# Patient Record
Sex: MALE | Race: WHITE | NOT HISPANIC OR LATINO | Employment: OTHER | URBAN - METROPOLITAN AREA
[De-identification: names, ages, dates, MRNs, and addresses within clinical notes are randomized per-mention and may not be internally consistent; named-entity substitution may affect disease eponyms.]

---

## 2017-05-08 NOTE — PATIENT DISCUSSION
DRY EYES : Discussed with patient the importance of keeping the eye moist and the symptoms associated with dry eyes including blurry vision, tearing, burning, and philip sensation. Advised patient to minimize use of any fans blowing directly on the face. Advised patient to continue with artificial tears 2-3 times daily.

## 2017-05-08 NOTE — PATIENT DISCUSSION
POSTERIOR CAPSULAR FIBROSIS, OU - VISUALLY SIGNIFICANT. SCHEDULE YAG CAPSULOTOMY OD FIRST THEN LATER YAG CAPSULOTOMY OS IF VISUAL SYMPTOMS PERSIST.

## 2017-05-30 NOTE — PATIENT DISCUSSION
ADVISED PT THAT SHE NEEDS TO USE ARTIFICIAL TEARS SEVERAL TIMES A DAY FOR THE DRYNESS.  CALL IF SYMPTOMS WORSEN

## 2017-08-17 ENCOUNTER — ESTABLISHED PATIENT (OUTPATIENT)
Dept: URBAN - METROPOLITAN AREA CLINIC 43 | Facility: CLINIC | Age: 77
End: 2017-08-17

## 2017-08-17 DIAGNOSIS — H35.3111: ICD-10-CM

## 2017-08-17 DIAGNOSIS — H43.812: ICD-10-CM

## 2017-08-17 DIAGNOSIS — H35.3221: ICD-10-CM

## 2017-08-17 DIAGNOSIS — H25.813: ICD-10-CM

## 2017-08-17 PROCEDURE — 92134 CPTRZ OPH DX IMG PST SGM RTA: CPT

## 2017-08-17 PROCEDURE — 92014 COMPRE OPH EXAM EST PT 1/>: CPT

## 2017-08-17 PROCEDURE — 67028 INJECTION EYE DRUG: CPT

## 2017-08-17 ASSESSMENT — TONOMETRY
OS_IOP_MMHG: 17
OD_IOP_MMHG: 17

## 2017-08-17 ASSESSMENT — VISUAL ACUITY
OD_CC: 20/30+1
OS_CC: 20/30-2

## 2017-09-18 ENCOUNTER — ESTABLISHED PATIENT (OUTPATIENT)
Dept: URBAN - METROPOLITAN AREA CLINIC 35 | Facility: CLINIC | Age: 77
End: 2017-09-18

## 2017-09-18 DIAGNOSIS — H35.3221: ICD-10-CM

## 2017-09-18 PROCEDURE — 67028 INJECTION EYE DRUG: CPT

## 2017-09-18 ASSESSMENT — VISUAL ACUITY
OS_CC: 20/30+2
OD_CC: 20/30-2

## 2017-10-16 ENCOUNTER — EST. PATIENT EMERGENCY (OUTPATIENT)
Dept: URBAN - METROPOLITAN AREA CLINIC 43 | Facility: CLINIC | Age: 77
End: 2017-10-16

## 2017-10-16 DIAGNOSIS — H43.811: ICD-10-CM

## 2017-10-16 DIAGNOSIS — H43.812: ICD-10-CM

## 2017-10-16 DIAGNOSIS — H35.3114: ICD-10-CM

## 2017-10-16 DIAGNOSIS — H35.3221: ICD-10-CM

## 2017-10-16 PROCEDURE — 92014 COMPRE OPH EXAM EST PT 1/>: CPT

## 2017-10-16 PROCEDURE — 92134 CPTRZ OPH DX IMG PST SGM RTA: CPT

## 2017-10-16 ASSESSMENT — VISUAL ACUITY
OD_CC: 20/40+2
OS_CC: 20/30

## 2017-10-16 ASSESSMENT — TONOMETRY
OS_IOP_MMHG: 15
OD_IOP_MMHG: 17

## 2017-10-17 ENCOUNTER — EST. PATIENT EMERGENCY (OUTPATIENT)
Dept: URBAN - METROPOLITAN AREA CLINIC 46 | Facility: CLINIC | Age: 77
End: 2017-10-17

## 2017-10-17 DIAGNOSIS — H43.821: ICD-10-CM

## 2017-10-17 DIAGNOSIS — H43.822: ICD-10-CM

## 2017-10-17 DIAGNOSIS — H25.813: ICD-10-CM

## 2017-10-17 DIAGNOSIS — H35.3221: ICD-10-CM

## 2017-10-17 DIAGNOSIS — H35.3211: ICD-10-CM

## 2017-10-17 DIAGNOSIS — H43.813: ICD-10-CM

## 2017-10-17 PROCEDURE — 9222650 BILAT EXTENDED OPHTHALMOSCOPY, F/U

## 2017-10-17 PROCEDURE — 92250 FUNDUS PHOTOGRAPHY W/I&R: CPT

## 2017-10-17 PROCEDURE — 92134 CPTRZ OPH DX IMG PST SGM RTA: CPT

## 2017-10-17 PROCEDURE — 92235 FLUORESCEIN ANGRPH MLTIFRAME: CPT

## 2017-10-17 PROCEDURE — 92014 COMPRE OPH EXAM EST PT 1/>: CPT

## 2017-10-17 PROCEDURE — 67028 INJECTION EYE DRUG: CPT

## 2017-10-17 ASSESSMENT — VISUAL ACUITY
OS_CC: 20/25-1
OD_CC: 20/30-2

## 2017-10-17 ASSESSMENT — TONOMETRY
OS_IOP_MMHG: 14
OD_IOP_MMHG: 13

## 2017-10-27 ENCOUNTER — CONSULT (OUTPATIENT)
Dept: URBAN - METROPOLITAN AREA CLINIC 43 | Facility: CLINIC | Age: 77
End: 2017-10-27

## 2017-10-27 VITALS — HEIGHT: 60 IN | DIASTOLIC BLOOD PRESSURE: 51 MMHG | SYSTOLIC BLOOD PRESSURE: 106 MMHG | HEART RATE: 93 BPM

## 2017-10-27 DIAGNOSIS — H35.3211: ICD-10-CM

## 2017-10-27 DIAGNOSIS — H43.821: ICD-10-CM

## 2017-10-27 DIAGNOSIS — H43.822: ICD-10-CM

## 2017-10-27 DIAGNOSIS — H35.3221: ICD-10-CM

## 2017-10-27 PROCEDURE — 92134 CPTRZ OPH DX IMG PST SGM RTA: CPT

## 2017-10-27 PROCEDURE — G8427 DOCREV CUR MEDS BY ELIG CLIN: HCPCS

## 2017-10-27 PROCEDURE — G8752 SYS BP LESS 140: HCPCS

## 2017-10-27 PROCEDURE — 92014 COMPRE OPH EXAM EST PT 1/>: CPT

## 2017-10-27 PROCEDURE — G8754 DIAS BP LESS 90: HCPCS

## 2017-10-27 PROCEDURE — 4177F TALK PT/CRGVR RE AREDS PREV: CPT

## 2017-10-27 PROCEDURE — 2019F DILATED MACUL EXAM DONE: CPT

## 2017-10-27 ASSESSMENT — VISUAL ACUITY
OD_CC: J4
OS_CC: 20/30+1
OS_CC: J1
OD_CC: 20/70+1

## 2017-10-27 ASSESSMENT — TONOMETRY
OS_IOP_MMHG: 15
OD_IOP_MMHG: 16

## 2017-11-15 ENCOUNTER — ESTABLISHED PATIENT (OUTPATIENT)
Dept: URBAN - METROPOLITAN AREA CLINIC 43 | Facility: CLINIC | Age: 77
End: 2017-11-15

## 2017-11-15 DIAGNOSIS — H35.3221: ICD-10-CM

## 2017-11-15 DIAGNOSIS — H35.3211: ICD-10-CM

## 2017-11-15 PROCEDURE — 6702850 BILATERAL INTRAVITREAL INJECTION

## 2017-11-15 ASSESSMENT — VISUAL ACUITY
OS_CC: 20/30-2
OD_CC: 20/60-2

## 2017-11-15 ASSESSMENT — TONOMETRY
OD_IOP_MMHG: 14
OS_IOP_MMHG: 13

## 2017-12-13 ENCOUNTER — ESTABLISHED PATIENT (OUTPATIENT)
Dept: URBAN - METROPOLITAN AREA CLINIC 43 | Facility: CLINIC | Age: 77
End: 2017-12-13

## 2017-12-13 DIAGNOSIS — H35.3211: ICD-10-CM

## 2017-12-13 PROCEDURE — 67028 INJECTION EYE DRUG: CPT

## 2017-12-13 ASSESSMENT — VISUAL ACUITY
OD_CC: 20/40-2
OS_CC: 20/30-2

## 2017-12-13 ASSESSMENT — TONOMETRY: OD_IOP_MMHG: 16

## 2018-01-25 ENCOUNTER — ESTABLISHED PATIENT (OUTPATIENT)
Dept: URBAN - METROPOLITAN AREA CLINIC 43 | Facility: CLINIC | Age: 78
End: 2018-01-25

## 2018-01-25 DIAGNOSIS — H43.822: ICD-10-CM

## 2018-01-25 DIAGNOSIS — H35.3212: ICD-10-CM

## 2018-01-25 DIAGNOSIS — H35.3222: ICD-10-CM

## 2018-01-25 DIAGNOSIS — H43.821: ICD-10-CM

## 2018-01-25 PROCEDURE — 2019F DILATED MACUL EXAM DONE: CPT

## 2018-01-25 PROCEDURE — G8756 NO BP MEASURE DOC: HCPCS

## 2018-01-25 PROCEDURE — G8427 DOCREV CUR MEDS BY ELIG CLIN: HCPCS

## 2018-01-25 PROCEDURE — 92134 CPTRZ OPH DX IMG PST SGM RTA: CPT

## 2018-01-25 PROCEDURE — 9222650 BILAT EXTENDED OPHTHALMOSCOPY, F/U

## 2018-01-25 PROCEDURE — 92012 INTRM OPH EXAM EST PATIENT: CPT

## 2018-01-25 PROCEDURE — 4177F TALK PT/CRGVR RE AREDS PREV: CPT

## 2018-01-25 ASSESSMENT — VISUAL ACUITY
OD_CC: 20/40+2
OS_CC: 20/30+1

## 2018-01-25 ASSESSMENT — TONOMETRY
OD_IOP_MMHG: 17
OS_IOP_MMHG: 15

## 2018-01-31 ENCOUNTER — ESTABLISHED PATIENT (OUTPATIENT)
Dept: URBAN - METROPOLITAN AREA CLINIC 43 | Facility: CLINIC | Age: 78
End: 2018-01-31

## 2018-01-31 DIAGNOSIS — H35.3222: ICD-10-CM

## 2018-01-31 DIAGNOSIS — H35.3212: ICD-10-CM

## 2018-01-31 PROCEDURE — 6702850 BILATERAL INTRAVITREAL INJECTION

## 2018-01-31 ASSESSMENT — VISUAL ACUITY
OS_CC: 20/25+2
OD_CC: 20/25

## 2018-01-31 ASSESSMENT — TONOMETRY
OD_IOP_MMHG: 15
OS_IOP_MMHG: 14

## 2018-03-07 ENCOUNTER — ESTABLISHED PATIENT (OUTPATIENT)
Dept: URBAN - METROPOLITAN AREA CLINIC 43 | Facility: CLINIC | Age: 78
End: 2018-03-07

## 2018-03-07 DIAGNOSIS — H35.3212: ICD-10-CM

## 2018-03-07 PROCEDURE — 67028 INJECTION EYE DRUG: CPT

## 2018-03-07 ASSESSMENT — VISUAL ACUITY
OS_CC: 20/25-1
OD_CC: 20/25-2

## 2018-03-07 ASSESSMENT — TONOMETRY: OD_IOP_MMHG: 18

## 2018-04-11 ENCOUNTER — ESTABLISHED PATIENT (OUTPATIENT)
Dept: URBAN - METROPOLITAN AREA CLINIC 43 | Facility: CLINIC | Age: 78
End: 2018-04-11

## 2018-04-11 DIAGNOSIS — H35.3222: ICD-10-CM

## 2018-04-11 DIAGNOSIS — H35.3212: ICD-10-CM

## 2018-04-11 PROCEDURE — 6702850 BILATERAL INTRAVITREAL INJECTION

## 2018-04-11 ASSESSMENT — TONOMETRY
OD_IOP_MMHG: 16
OS_IOP_MMHG: 14

## 2018-04-11 ASSESSMENT — VISUAL ACUITY
OS_CC: 20/30
OD_CC: 20/30-1

## 2018-05-25 ENCOUNTER — ESTABLISHED PATIENT (OUTPATIENT)
Dept: URBAN - METROPOLITAN AREA CLINIC 43 | Facility: CLINIC | Age: 78
End: 2018-05-25

## 2018-05-25 DIAGNOSIS — H35.3222: ICD-10-CM

## 2018-05-25 DIAGNOSIS — H43.821: ICD-10-CM

## 2018-05-25 DIAGNOSIS — H35.3212: ICD-10-CM

## 2018-05-25 DIAGNOSIS — H43.822: ICD-10-CM

## 2018-05-25 PROCEDURE — 92134 CPTRZ OPH DX IMG PST SGM RTA: CPT

## 2018-05-25 PROCEDURE — 92012 INTRM OPH EXAM EST PATIENT: CPT

## 2018-05-25 PROCEDURE — 9222650 BILAT EXTENDED OPHTHALMOSCOPY, F/U

## 2018-05-25 ASSESSMENT — VISUAL ACUITY
OD_CC: 20/40+2
OS_CC: 20/40+1

## 2018-05-25 ASSESSMENT — TONOMETRY
OD_IOP_MMHG: 20
OS_IOP_MMHG: 18

## 2018-06-20 ENCOUNTER — CLINIC PROCEDURE ONLY (OUTPATIENT)
Dept: URBAN - METROPOLITAN AREA CLINIC 43 | Facility: CLINIC | Age: 78
End: 2018-06-20

## 2018-06-20 DIAGNOSIS — H35.3222: ICD-10-CM

## 2018-06-20 DIAGNOSIS — H35.3212: ICD-10-CM

## 2018-06-20 PROCEDURE — 6702850 BILATERAL INTRAVITREAL INJECTION

## 2018-06-20 ASSESSMENT — VISUAL ACUITY
OD_CC: 20/40+2
OS_CC: 20/30-2

## 2018-06-20 ASSESSMENT — TONOMETRY: OD_IOP_MMHG: 18

## 2018-08-08 ENCOUNTER — CLINIC PROCEDURE ONLY (OUTPATIENT)
Dept: URBAN - METROPOLITAN AREA CLINIC 43 | Facility: CLINIC | Age: 78
End: 2018-08-08

## 2018-08-08 DIAGNOSIS — H43.821: ICD-10-CM

## 2018-08-08 DIAGNOSIS — H35.3222: ICD-10-CM

## 2018-08-08 DIAGNOSIS — H43.812: ICD-10-CM

## 2018-08-08 DIAGNOSIS — H43.392: ICD-10-CM

## 2018-08-08 DIAGNOSIS — H35.3212: ICD-10-CM

## 2018-08-08 PROCEDURE — 92012 INTRM OPH EXAM EST PATIENT: CPT

## 2018-08-08 PROCEDURE — 92225 OPHTHALMOSCOPY (INITIAL): CPT

## 2018-08-08 PROCEDURE — 92134 CPTRZ OPH DX IMG PST SGM RTA: CPT

## 2018-08-08 PROCEDURE — 6702850 BILATERAL INTRAVITREAL INJECTION

## 2018-08-08 PROCEDURE — 92226 OPHTHALMOSCOPY (SUB): CPT

## 2018-08-08 ASSESSMENT — VISUAL ACUITY
OD_CC: 20/40+2
OS_CC: 20/30

## 2018-08-08 ASSESSMENT — TONOMETRY
OS_IOP_MMHG: 19
OD_IOP_MMHG: 20

## 2018-08-31 ENCOUNTER — ESTABLISHED PATIENT (OUTPATIENT)
Dept: URBAN - METROPOLITAN AREA CLINIC 43 | Facility: CLINIC | Age: 78
End: 2018-08-31

## 2018-08-31 DIAGNOSIS — H43.392: ICD-10-CM

## 2018-08-31 DIAGNOSIS — H43.812: ICD-10-CM

## 2018-08-31 DIAGNOSIS — H35.3212: ICD-10-CM

## 2018-08-31 DIAGNOSIS — H43.822: ICD-10-CM

## 2018-08-31 DIAGNOSIS — H35.3222: ICD-10-CM

## 2018-08-31 DIAGNOSIS — H43.821: ICD-10-CM

## 2018-08-31 PROCEDURE — 92012 INTRM OPH EXAM EST PATIENT: CPT

## 2018-08-31 PROCEDURE — 9222650 BILAT EXTENDED OPHTHALMOSCOPY, F/U

## 2018-08-31 PROCEDURE — 92134 CPTRZ OPH DX IMG PST SGM RTA: CPT

## 2018-08-31 ASSESSMENT — TONOMETRY
OS_IOP_MMHG: 17
OD_IOP_MMHG: 17

## 2018-08-31 ASSESSMENT — VISUAL ACUITY
OS_PH: 20/30-2
OD_PH: 20/30-2
OS_CC: 20/40+2
OD_CC: 20/40-1

## 2018-11-07 ENCOUNTER — ESTABLISHED PATIENT (OUTPATIENT)
Dept: URBAN - METROPOLITAN AREA CLINIC 43 | Facility: CLINIC | Age: 78
End: 2018-11-07

## 2018-11-07 DIAGNOSIS — H43.392: ICD-10-CM

## 2018-11-07 DIAGNOSIS — H43.812: ICD-10-CM

## 2018-11-07 DIAGNOSIS — H35.3211: ICD-10-CM

## 2018-11-07 DIAGNOSIS — H35.3222: ICD-10-CM

## 2018-11-07 DIAGNOSIS — H43.821: ICD-10-CM

## 2018-11-07 PROCEDURE — 9222650 BILAT EXTENDED OPHTHALMOSCOPY, F/U

## 2018-11-07 PROCEDURE — 92012 INTRM OPH EXAM EST PATIENT: CPT

## 2018-11-07 PROCEDURE — 92134 CPTRZ OPH DX IMG PST SGM RTA: CPT

## 2018-11-07 ASSESSMENT — VISUAL ACUITY
OD_CC: 20/200
OS_CC: 20/25-2

## 2018-11-07 ASSESSMENT — TONOMETRY
OS_IOP_MMHG: 18
OD_IOP_MMHG: 20

## 2018-12-05 ENCOUNTER — CLINIC PROCEDURE ONLY (OUTPATIENT)
Dept: URBAN - METROPOLITAN AREA CLINIC 43 | Facility: CLINIC | Age: 78
End: 2018-12-05

## 2018-12-05 DIAGNOSIS — H35.3211: ICD-10-CM

## 2018-12-05 PROCEDURE — 67028 INJECTION EYE DRUG: CPT

## 2018-12-05 ASSESSMENT — TONOMETRY: OD_IOP_MMHG: 20

## 2018-12-05 ASSESSMENT — VISUAL ACUITY
OS_CC: 20/30-2
OD_CC: 20/100-1+1

## 2019-01-02 ENCOUNTER — CLINIC PROCEDURE ONLY (OUTPATIENT)
Dept: URBAN - METROPOLITAN AREA CLINIC 43 | Facility: CLINIC | Age: 79
End: 2019-01-02

## 2019-01-02 DIAGNOSIS — H35.3222: ICD-10-CM

## 2019-01-02 DIAGNOSIS — H35.3211: ICD-10-CM

## 2019-01-02 PROCEDURE — 6702850 BILATERAL INTRAVITREAL INJECTION

## 2019-01-02 ASSESSMENT — TONOMETRY
OD_IOP_MMHG: 18
OS_IOP_MMHG: 18

## 2019-01-02 ASSESSMENT — VISUAL ACUITY
OD_CC: 20/200
OS_CC: 20/40

## 2019-01-31 ENCOUNTER — ESTABLISHED PATIENT (OUTPATIENT)
Dept: URBAN - METROPOLITAN AREA CLINIC 43 | Facility: CLINIC | Age: 79
End: 2019-01-31

## 2019-01-31 DIAGNOSIS — H43.821: ICD-10-CM

## 2019-01-31 DIAGNOSIS — H43.812: ICD-10-CM

## 2019-01-31 DIAGNOSIS — H35.3221: ICD-10-CM

## 2019-01-31 DIAGNOSIS — H35.3211: ICD-10-CM

## 2019-01-31 DIAGNOSIS — H43.392: ICD-10-CM

## 2019-01-31 PROCEDURE — 92012 INTRM OPH EXAM EST PATIENT: CPT

## 2019-01-31 PROCEDURE — 9222650 BILAT EXTENDED OPHTHALMOSCOPY, F/U

## 2019-01-31 PROCEDURE — 92134 CPTRZ OPH DX IMG PST SGM RTA: CPT

## 2019-01-31 RX ORDER — LATANOPROST 0.05 MG/ML
1 SOLUTION/ DROPS OPHTHALMIC; TOPICAL EVERY EVENING
Start: 2019-01-31

## 2019-01-31 ASSESSMENT — VISUAL ACUITY
OD_CC: 20/200
OS_CC: 20/25

## 2019-01-31 ASSESSMENT — TONOMETRY
OS_IOP_MMHG: 26
OD_IOP_MMHG: 24

## 2019-02-06 ENCOUNTER — CLINIC PROCEDURE ONLY (OUTPATIENT)
Dept: URBAN - METROPOLITAN AREA CLINIC 43 | Facility: CLINIC | Age: 79
End: 2019-02-06

## 2019-02-06 DIAGNOSIS — H35.3211: ICD-10-CM

## 2019-02-06 DIAGNOSIS — H35.3221: ICD-10-CM

## 2019-02-06 PROCEDURE — 6702850 BILATERAL INTRAVITREAL INJECTION

## 2019-02-06 RX ORDER — LATANOPROST 50 UG/ML: 1 SOLUTION/ DROPS OPHTHALMIC EVERY EVENING

## 2019-02-06 ASSESSMENT — VISUAL ACUITY
OD_CC: 20/70+1
OS_CC: 20/25-1

## 2019-02-06 ASSESSMENT — TONOMETRY
OD_IOP_MMHG: 14
OS_IOP_MMHG: 13

## 2019-03-07 ENCOUNTER — ESTABLISHED PATIENT (OUTPATIENT)
Dept: URBAN - METROPOLITAN AREA CLINIC 43 | Facility: CLINIC | Age: 79
End: 2019-03-07

## 2019-03-07 DIAGNOSIS — H35.3211: ICD-10-CM

## 2019-03-07 DIAGNOSIS — H35.3221: ICD-10-CM

## 2019-03-07 PROCEDURE — 9222650 BILAT EXTENDED OPHTHALMOSCOPY, F/U

## 2019-03-07 PROCEDURE — 92134 CPTRZ OPH DX IMG PST SGM RTA: CPT

## 2019-03-07 PROCEDURE — 92012 INTRM OPH EXAM EST PATIENT: CPT

## 2019-03-07 RX ORDER — TIMOLOL MALEATE 6.8 MG/ML
1 SOLUTION OPHTHALMIC EVERY MORNING
Start: 2019-03-07

## 2019-03-07 ASSESSMENT — TONOMETRY
OD_IOP_MMHG: 23
OS_IOP_MMHG: 22

## 2019-03-07 ASSESSMENT — VISUAL ACUITY
OS_CC: 20/40+2
OD_CC: 20/400

## 2019-03-13 ENCOUNTER — CLINIC PROCEDURE ONLY (OUTPATIENT)
Dept: URBAN - METROPOLITAN AREA CLINIC 43 | Facility: CLINIC | Age: 79
End: 2019-03-13

## 2019-03-13 DIAGNOSIS — H35.3221: ICD-10-CM

## 2019-03-13 DIAGNOSIS — H35.3211: ICD-10-CM

## 2019-03-13 PROCEDURE — 6702850 BILATERAL INTRAVITREAL INJECTION

## 2019-03-13 ASSESSMENT — TONOMETRY
OS_IOP_MMHG: 16
OD_IOP_MMHG: 17

## 2019-03-13 ASSESSMENT — VISUAL ACUITY
OD_CC: 20/400
OS_CC: 20/40+2

## 2019-04-16 ENCOUNTER — ESTABLISHED COMPREHENSIVE EXAM (OUTPATIENT)
Dept: URBAN - METROPOLITAN AREA CLINIC 43 | Facility: CLINIC | Age: 79
End: 2019-04-16

## 2019-04-16 DIAGNOSIS — H40.053: ICD-10-CM

## 2019-04-16 DIAGNOSIS — H25.813: ICD-10-CM

## 2019-04-16 DIAGNOSIS — H40.033: ICD-10-CM

## 2019-04-16 PROCEDURE — 92132 CPTRZD OPH DX IMG ANT SGM: CPT

## 2019-04-16 PROCEDURE — 92015 DETERMINE REFRACTIVE STATE: CPT

## 2019-04-16 PROCEDURE — 92014 COMPRE OPH EXAM EST PT 1/>: CPT

## 2019-04-16 ASSESSMENT — VISUAL ACUITY
OD_SC: J14
OS_CC: 20/30-2
OS_SC: J2
OS_SC: 20/200+1
OS_CC: J1
OD_BAT: <20/400
OD_CC: J14
OS_BAT: <20/400

## 2019-04-16 ASSESSMENT — TONOMETRY
OD_IOP_MMHG: 20
OS_IOP_MMHG: 18

## 2019-04-26 ENCOUNTER — ESTABLISHED PATIENT (OUTPATIENT)
Dept: URBAN - METROPOLITAN AREA CLINIC 43 | Facility: CLINIC | Age: 79
End: 2019-04-26

## 2019-04-26 DIAGNOSIS — H35.3221: ICD-10-CM

## 2019-04-26 DIAGNOSIS — H35.3211: ICD-10-CM

## 2019-04-26 PROCEDURE — 9222650 BILAT EXTENDED OPHTHALMOSCOPY, F/U

## 2019-04-26 PROCEDURE — 92012 INTRM OPH EXAM EST PATIENT: CPT

## 2019-04-26 PROCEDURE — 92134 CPTRZ OPH DX IMG PST SGM RTA: CPT

## 2019-04-26 ASSESSMENT — VISUAL ACUITY
OS_CC: 20/40+2
OD_CC: 20/200+1

## 2019-04-26 ASSESSMENT — TONOMETRY
OD_IOP_MMHG: 17
OS_IOP_MMHG: 16

## 2019-04-29 ENCOUNTER — CATARACT CONSULT (OUTPATIENT)
Dept: URBAN - METROPOLITAN AREA CLINIC 43 | Facility: CLINIC | Age: 79
End: 2019-04-29

## 2019-04-29 VITALS
RESPIRATION RATE: 14 BRPM | HEIGHT: 60 IN | SYSTOLIC BLOOD PRESSURE: 157 MMHG | HEART RATE: 68 BPM | DIASTOLIC BLOOD PRESSURE: 90 MMHG

## 2019-04-29 DIAGNOSIS — H43.812: ICD-10-CM

## 2019-04-29 DIAGNOSIS — H40.1131: ICD-10-CM

## 2019-04-29 DIAGNOSIS — H43.822: ICD-10-CM

## 2019-04-29 DIAGNOSIS — H25.811: ICD-10-CM

## 2019-04-29 DIAGNOSIS — H40.033: ICD-10-CM

## 2019-04-29 DIAGNOSIS — H25.812: ICD-10-CM

## 2019-04-29 PROCEDURE — 92014 COMPRE OPH EXAM EST PT 1/>: CPT

## 2019-04-29 PROCEDURE — V2799I IMPRIMIS PREDGATIBROM

## 2019-04-29 PROCEDURE — 92136TC INTERFEROMETRY - TECHNICAL COMPONENT

## 2019-04-29 PROCEDURE — 92025-2 CORNEAL TOPOGRAPHY, PT

## 2019-04-29 RX ORDER — AMOXICILLIN 500 MG/1: 1 CAPSULE ORAL

## 2019-04-29 ASSESSMENT — VISUAL ACUITY
OD_CC: 20/400
OS_BAT: 20/200
OS_CC: 20/30+2
OS_CC: J12
OD_CC: J2
OD_BAT: 20/400

## 2019-04-29 ASSESSMENT — TONOMETRY
OD_IOP_MMHG: 18
OS_IOP_MMHG: 19

## 2019-05-01 ENCOUNTER — CLINIC PROCEDURE ONLY (OUTPATIENT)
Dept: URBAN - METROPOLITAN AREA CLINIC 43 | Facility: CLINIC | Age: 79
End: 2019-05-01

## 2019-05-01 DIAGNOSIS — H35.3211: ICD-10-CM

## 2019-05-01 DIAGNOSIS — H35.3221: ICD-10-CM

## 2019-05-01 PROCEDURE — 6702850 BILATERAL INTRAVITREAL INJECTION

## 2019-05-01 ASSESSMENT — VISUAL ACUITY
OD_CC: 20/400
OS_CC: 20/40+2

## 2019-05-01 ASSESSMENT — TONOMETRY
OS_IOP_MMHG: 15
OD_IOP_MMHG: 16

## 2019-05-09 ENCOUNTER — SURGERY/PROCEDURE (OUTPATIENT)
Dept: URBAN - METROPOLITAN AREA CLINIC 43 | Facility: CLINIC | Age: 79
End: 2019-05-09

## 2019-05-09 ENCOUNTER — PRE-OP/H&P (OUTPATIENT)
Dept: URBAN - METROPOLITAN AREA SURGERY 14 | Facility: SURGERY | Age: 79
End: 2019-05-09

## 2019-05-09 DIAGNOSIS — H40.1121: ICD-10-CM

## 2019-05-09 DIAGNOSIS — H40.1131: ICD-10-CM

## 2019-05-09 DIAGNOSIS — H40.033: ICD-10-CM

## 2019-05-09 DIAGNOSIS — H43.822: ICD-10-CM

## 2019-05-09 DIAGNOSIS — H25.812: ICD-10-CM

## 2019-05-09 DIAGNOSIS — H25.811: ICD-10-CM

## 2019-05-09 DIAGNOSIS — H25.813: ICD-10-CM

## 2019-05-09 PROCEDURE — 66999LNSR LENSAR LASER FOR CAT SX

## 2019-05-09 PROCEDURE — 0191T INSERTION OF ANTERIOR SEGMENT AQUEOUS DRAINAGE DEVICE, WITHOUT EXTRAOCULAR RESERVOIR; INTERNAL APPROACH, INTO THE TRABECULAR MESHWORK, INITIAL INSERTION: CPT

## 2019-05-09 PROCEDURE — 99499 UNLISTED E&M SERVICE: CPT

## 2019-05-09 PROCEDURE — 66984CV REMOVE CATARACT, INSERT LENS, CUSTOM VISION

## 2019-05-09 PROCEDURE — 65772LRI LRI DURING CAT SX

## 2019-05-09 PROCEDURE — 0376T INSERTION OF ANTERIOR SEGMENT AQUEOUS DRAINAGE DEVICE, WITHOUT EXTRAOCULAR RESERVOIR, INTERNAL APPROACH, INTO THE TRABECULAR MESHWORK; EACH ADDITIONAL DEVICE INSERTION (LIST SEPARATELY IN ADDITION TO CODE FOR PRIMARY PROCEDURE): CPT

## 2019-05-10 ENCOUNTER — CATARACT POST-OP 1-DAY (OUTPATIENT)
Dept: URBAN - METROPOLITAN AREA CLINIC 43 | Facility: CLINIC | Age: 79
End: 2019-05-10

## 2019-05-10 DIAGNOSIS — Z96.1: ICD-10-CM

## 2019-05-10 DIAGNOSIS — H40.1131: ICD-10-CM

## 2019-05-10 PROCEDURE — 99024 POSTOP FOLLOW-UP VISIT: CPT

## 2019-05-10 ASSESSMENT — VISUAL ACUITY
OS_SC: 20/50
OD_SC: CF 4FT
OD_PH: 20/40

## 2019-05-10 ASSESSMENT — TONOMETRY
OS_IOP_MMHG: 13
OD_IOP_MMHG: 17

## 2019-05-15 ENCOUNTER — POST-OP (OUTPATIENT)
Dept: URBAN - METROPOLITAN AREA CLINIC 43 | Facility: CLINIC | Age: 79
End: 2019-05-15

## 2019-05-15 DIAGNOSIS — Z96.1: ICD-10-CM

## 2019-05-15 PROCEDURE — 99024 POSTOP FOLLOW-UP VISIT: CPT

## 2019-05-15 ASSESSMENT — VISUAL ACUITY
OS_PH: 20/60
OS_SC: J14
OD_SC: J14
OS_SC: 20/80

## 2019-05-15 ASSESSMENT — TONOMETRY
OS_IOP_MMHG: 14
OD_IOP_MMHG: 17

## 2019-05-22 ENCOUNTER — POST-OP (OUTPATIENT)
Dept: URBAN - METROPOLITAN AREA CLINIC 43 | Facility: CLINIC | Age: 79
End: 2019-05-22

## 2019-05-22 DIAGNOSIS — Z96.1: ICD-10-CM

## 2019-05-22 DIAGNOSIS — H04.122: ICD-10-CM

## 2019-05-22 PROCEDURE — 68761S PUNCTUM PLUG / SILICONE,EACH

## 2019-05-22 PROCEDURE — 99024 POSTOP FOLLOW-UP VISIT: CPT

## 2019-05-22 PROCEDURE — A4263 PERMANENT TEAR DUCT PLUG: HCPCS

## 2019-05-22 RX ORDER — LOTEPREDNOL ETABONATE 5 MG/G
1 GEL OPHTHALMIC
Start: 2019-05-22

## 2019-05-22 ASSESSMENT — TONOMETRY
OS_IOP_MMHG: 14
OD_IOP_MMHG: 16

## 2019-05-22 ASSESSMENT — VISUAL ACUITY
OS_SC: <J12
OS_SC: 20/60

## 2019-06-03 ENCOUNTER — POST-OP (OUTPATIENT)
Dept: URBAN - METROPOLITAN AREA CLINIC 43 | Facility: CLINIC | Age: 79
End: 2019-06-03

## 2019-06-03 DIAGNOSIS — H40.1131: ICD-10-CM

## 2019-06-03 DIAGNOSIS — H04.123: ICD-10-CM

## 2019-06-03 DIAGNOSIS — Z96.1: ICD-10-CM

## 2019-06-03 PROCEDURE — 99024 POSTOP FOLLOW-UP VISIT: CPT

## 2019-06-03 PROCEDURE — 68761S PUNCTUM PLUG / SILICONE,EACH

## 2019-06-03 PROCEDURE — 92020 GONIOSCOPY: CPT

## 2019-06-03 PROCEDURE — A4263 PERMANENT TEAR DUCT PLUG: HCPCS

## 2019-06-03 RX ORDER — BROMFENAC SODIUM 0.7 MG/ML: 1 SOLUTION/ DROPS OPHTHALMIC ONCE A DAY

## 2019-06-03 ASSESSMENT — VISUAL ACUITY
OD_SC: 20/400
OD_SC: 20/400
OS_SC: J8
OS_SC: 20/50-2
OU_SC: J8
OU_SC: 20/50-2

## 2019-06-03 ASSESSMENT — TONOMETRY
OS_IOP_MMHG: 15
OD_IOP_MMHG: 18

## 2019-06-12 ENCOUNTER — POST OP/EVAL OF SECOND EYE (OUTPATIENT)
Dept: URBAN - METROPOLITAN AREA CLINIC 43 | Facility: CLINIC | Age: 79
End: 2019-06-12

## 2019-06-12 ENCOUNTER — CLINIC PROCEDURE ONLY (OUTPATIENT)
Dept: URBAN - METROPOLITAN AREA CLINIC 43 | Facility: CLINIC | Age: 79
End: 2019-06-12

## 2019-06-12 DIAGNOSIS — H35.3211: ICD-10-CM

## 2019-06-12 DIAGNOSIS — H04.123: ICD-10-CM

## 2019-06-12 DIAGNOSIS — Z96.1: ICD-10-CM

## 2019-06-12 DIAGNOSIS — H35.3221: ICD-10-CM

## 2019-06-12 PROCEDURE — 6702850 BILATERAL INTRAVITREAL INJECTION

## 2019-06-12 PROCEDURE — 99024 POSTOP FOLLOW-UP VISIT: CPT

## 2019-06-12 ASSESSMENT — VISUAL ACUITY
OS_SC: 20/40
OD_SC: 20/400
OS_SC: 20/40
OD_SC: 20/400

## 2019-06-12 ASSESSMENT — TONOMETRY
OS_IOP_MMHG: 16
OD_IOP_MMHG: 23
OD_IOP_MMHG: 23
OS_IOP_MMHG: 16

## 2019-07-01 ENCOUNTER — POST-OP (OUTPATIENT)
Dept: URBAN - METROPOLITAN AREA CLINIC 43 | Facility: CLINIC | Age: 79
End: 2019-07-01

## 2019-07-01 DIAGNOSIS — Z96.1: ICD-10-CM

## 2019-07-01 DIAGNOSIS — H40.1131: ICD-10-CM

## 2019-07-01 PROCEDURE — 99024 POSTOP FOLLOW-UP VISIT: CPT

## 2019-07-01 PROCEDURE — 92025NC COMP. CORNEAL TOPO, UNI OR BILAT,

## 2019-07-01 ASSESSMENT — TONOMETRY
OS_IOP_MMHG: 19
OD_IOP_MMHG: 22

## 2019-07-01 ASSESSMENT — VISUAL ACUITY
OD_SC: 20/400
OS_SC: 20/40-1

## 2019-07-18 ENCOUNTER — ESTABLISHED PATIENT (OUTPATIENT)
Dept: URBAN - METROPOLITAN AREA CLINIC 43 | Facility: CLINIC | Age: 79
End: 2019-07-18

## 2019-07-18 DIAGNOSIS — H43.812: ICD-10-CM

## 2019-07-18 DIAGNOSIS — H43.822: ICD-10-CM

## 2019-07-18 DIAGNOSIS — H43.821: ICD-10-CM

## 2019-07-18 DIAGNOSIS — H35.3211: ICD-10-CM

## 2019-07-18 DIAGNOSIS — H35.3221: ICD-10-CM

## 2019-07-18 DIAGNOSIS — H43.392: ICD-10-CM

## 2019-07-18 PROCEDURE — 92012 INTRM OPH EXAM EST PATIENT: CPT

## 2019-07-18 PROCEDURE — 9222650 BILAT EXTENDED OPHTHALMOSCOPY, F/U

## 2019-07-18 PROCEDURE — 92134 CPTRZ OPH DX IMG PST SGM RTA: CPT

## 2019-07-18 ASSESSMENT — VISUAL ACUITY
OS_CC: 20/25
OD_CC: 20/100

## 2019-07-18 ASSESSMENT — TONOMETRY
OS_IOP_MMHG: 19
OD_IOP_MMHG: 17

## 2019-07-24 ENCOUNTER — CLINIC PROCEDURE ONLY (OUTPATIENT)
Dept: URBAN - METROPOLITAN AREA CLINIC 43 | Facility: CLINIC | Age: 79
End: 2019-07-24

## 2019-07-24 DIAGNOSIS — H35.3221: ICD-10-CM

## 2019-07-24 DIAGNOSIS — H35.3211: ICD-10-CM

## 2019-07-24 PROCEDURE — 6702850 BILATERAL INTRAVITREAL INJECTION

## 2019-07-24 ASSESSMENT — VISUAL ACUITY
OS_CC: 20/30+1
OD_CC: 20/300

## 2019-07-24 ASSESSMENT — TONOMETRY
OS_IOP_MMHG: 10
OD_IOP_MMHG: 14

## 2019-09-04 ENCOUNTER — CLINIC PROCEDURE ONLY (OUTPATIENT)
Dept: URBAN - METROPOLITAN AREA CLINIC 43 | Facility: CLINIC | Age: 79
End: 2019-09-04

## 2019-09-04 DIAGNOSIS — H35.3211: ICD-10-CM

## 2019-09-04 DIAGNOSIS — H35.3221: ICD-10-CM

## 2019-09-04 PROCEDURE — 6702850 BILATERAL INTRAVITREAL INJECTION

## 2019-09-04 ASSESSMENT — TONOMETRY
OS_IOP_MMHG: 10
OD_IOP_MMHG: 18

## 2019-09-04 ASSESSMENT — VISUAL ACUITY
OS_CC: 20/30
OD_CC: 20/400

## 2019-10-16 ENCOUNTER — SURGERY/PROCEDURE (OUTPATIENT)
Dept: URBAN - METROPOLITAN AREA CLINIC 43 | Facility: CLINIC | Age: 79
End: 2019-10-16

## 2019-10-16 ENCOUNTER — ESTABLISHED PATIENT (OUTPATIENT)
Dept: URBAN - METROPOLITAN AREA SURGERY 14 | Facility: SURGERY | Age: 79
End: 2019-10-16

## 2019-10-16 DIAGNOSIS — H40.031: ICD-10-CM

## 2019-10-16 DIAGNOSIS — H25.811: ICD-10-CM

## 2019-10-16 DIAGNOSIS — H40.1131: ICD-10-CM

## 2019-10-16 DIAGNOSIS — H40.1111: ICD-10-CM

## 2019-10-16 DIAGNOSIS — Z96.1: ICD-10-CM

## 2019-10-16 DIAGNOSIS — H43.822: ICD-10-CM

## 2019-10-16 PROCEDURE — 0191T INSERTION OF ANTERIOR SEGMENT AQUEOUS DRAINAGE DEVICE, WITHOUT EXTRAOCULAR RESERVOIR; INTERNAL APPROACH, INTO THE TRABECULAR MESHWORK, INITIAL INSERTION: CPT

## 2019-10-16 PROCEDURE — 99211HP H&P OFFICE/OUTPATIENT VISIT, EST

## 2019-10-16 PROCEDURE — 66984 XCAPSL CTRC RMVL W/O ECP: CPT

## 2019-10-16 PROCEDURE — 0376T INSERTION OF ANTERIOR SEGMENT AQUEOUS DRAINAGE DEVICE, WITHOUT EXTRAOCULAR RESERVOIR, INTERNAL APPROACH, INTO THE TRABECULAR MESHWORK; EACH ADDITIONAL DEVICE INSERTION (LIST SEPARATELY IN ADDITION TO CODE FOR PRIMARY PROCEDURE): CPT

## 2019-10-17 ENCOUNTER — CATARACT POST-OP 1-DAY (OUTPATIENT)
Dept: URBAN - METROPOLITAN AREA CLINIC 43 | Facility: CLINIC | Age: 79
End: 2019-10-17

## 2019-10-17 DIAGNOSIS — H40.031: ICD-10-CM

## 2019-10-17 DIAGNOSIS — Z96.1: ICD-10-CM

## 2019-10-17 DIAGNOSIS — H43.822: ICD-10-CM

## 2019-10-17 DIAGNOSIS — H40.1131: ICD-10-CM

## 2019-10-17 PROCEDURE — 99024 POSTOP FOLLOW-UP VISIT: CPT

## 2019-10-17 ASSESSMENT — VISUAL ACUITY
OS_SC: J12
OD_SC: CF 6FT
OS_SC: 20/40-2
OD_SC: <J12

## 2019-10-17 ASSESSMENT — TONOMETRY
OD_IOP_MMHG: 15
OS_IOP_MMHG: 12

## 2019-11-01 ENCOUNTER — POST-OP CATARACT (OUTPATIENT)
Dept: URBAN - METROPOLITAN AREA CLINIC 43 | Facility: CLINIC | Age: 79
End: 2019-11-01

## 2019-11-01 DIAGNOSIS — H43.822: ICD-10-CM

## 2019-11-01 DIAGNOSIS — Z96.1: ICD-10-CM

## 2019-11-01 DIAGNOSIS — H40.1131: ICD-10-CM

## 2019-11-01 PROCEDURE — 99024 POSTOP FOLLOW-UP VISIT: CPT

## 2019-11-01 ASSESSMENT — VISUAL ACUITY
OS_SC: J12
OD_SC: CF 6FT
OS_SC: 20/50-1+1
OD_SC: <J12

## 2019-11-01 ASSESSMENT — TONOMETRY
OD_IOP_MMHG: 16
OS_IOP_MMHG: 13

## 2019-11-07 ENCOUNTER — ESTABLISHED PATIENT (OUTPATIENT)
Dept: URBAN - METROPOLITAN AREA CLINIC 43 | Facility: CLINIC | Age: 79
End: 2019-11-07

## 2019-11-07 DIAGNOSIS — H43.812: ICD-10-CM

## 2019-11-07 DIAGNOSIS — H43.392: ICD-10-CM

## 2019-11-07 DIAGNOSIS — H35.3221: ICD-10-CM

## 2019-11-07 DIAGNOSIS — H43.821: ICD-10-CM

## 2019-11-07 DIAGNOSIS — H35.3211: ICD-10-CM

## 2019-11-07 PROCEDURE — 92014 COMPRE OPH EXAM EST PT 1/>: CPT

## 2019-11-07 PROCEDURE — 9222650 BILAT EXTENDED OPHTHALMOSCOPY, F/U

## 2019-11-07 PROCEDURE — 92134 CPTRZ OPH DX IMG PST SGM RTA: CPT

## 2019-11-07 ASSESSMENT — VISUAL ACUITY
OS_CC: 20/25-1
OD_SC: 20/400

## 2019-11-07 ASSESSMENT — TONOMETRY
OD_IOP_MMHG: 15
OS_IOP_MMHG: 15

## 2019-11-13 ENCOUNTER — REFRACTIVE CONSULT (OUTPATIENT)
Dept: URBAN - METROPOLITAN AREA CLINIC 43 | Facility: CLINIC | Age: 79
End: 2019-11-13

## 2019-11-13 DIAGNOSIS — Z96.1: ICD-10-CM

## 2019-11-13 PROCEDURE — 99024 POSTOP FOLLOW-UP VISIT: CPT

## 2019-11-13 ASSESSMENT — TONOMETRY
OD_IOP_MMHG: 17
OS_IOP_MMHG: 16

## 2019-11-13 ASSESSMENT — VISUAL ACUITY
OS_CC: 20/30
OD_CC: 20/400

## 2019-11-20 ENCOUNTER — CLINIC PROCEDURE ONLY (OUTPATIENT)
Dept: URBAN - METROPOLITAN AREA CLINIC 43 | Facility: CLINIC | Age: 79
End: 2019-11-20

## 2019-11-20 DIAGNOSIS — H35.3221: ICD-10-CM

## 2019-11-20 DIAGNOSIS — H35.3211: ICD-10-CM

## 2019-11-20 PROCEDURE — 6702850 BILATERAL INTRAVITREAL INJECTION

## 2019-11-20 ASSESSMENT — TONOMETRY
OS_IOP_MMHG: 15
OD_IOP_MMHG: 18

## 2019-11-20 ASSESSMENT — VISUAL ACUITY
OS_CC: 20/30-2
OD_CC: 20/400

## 2020-01-15 ENCOUNTER — CLINIC PROCEDURE ONLY (OUTPATIENT)
Dept: URBAN - METROPOLITAN AREA CLINIC 43 | Facility: CLINIC | Age: 80
End: 2020-01-15

## 2020-01-15 DIAGNOSIS — H35.3221: ICD-10-CM

## 2020-01-15 DIAGNOSIS — H35.3211: ICD-10-CM

## 2020-01-15 PROCEDURE — 6702850 BILATERAL INTRAVITREAL INJECTION

## 2020-01-15 ASSESSMENT — TONOMETRY
OS_IOP_MMHG: 12
OD_IOP_MMHG: 15

## 2020-01-15 ASSESSMENT — VISUAL ACUITY
OS_SC: 20/40-1
OD_SC: 20/400

## 2020-03-05 ENCOUNTER — ESTABLISHED PATIENT (OUTPATIENT)
Dept: URBAN - METROPOLITAN AREA CLINIC 43 | Facility: CLINIC | Age: 80
End: 2020-03-05

## 2020-03-05 VITALS — HEIGHT: 60 IN

## 2020-03-05 DIAGNOSIS — H35.3221: ICD-10-CM

## 2020-03-05 DIAGNOSIS — H35.3211: ICD-10-CM

## 2020-03-05 DIAGNOSIS — H43.812: ICD-10-CM

## 2020-03-05 DIAGNOSIS — H43.821: ICD-10-CM

## 2020-03-05 DIAGNOSIS — H43.392: ICD-10-CM

## 2020-03-05 PROCEDURE — 92014 COMPRE OPH EXAM EST PT 1/>: CPT

## 2020-03-05 PROCEDURE — 92134 CPTRZ OPH DX IMG PST SGM RTA: CPT

## 2020-03-05 PROCEDURE — 92201 OPSCPY EXTND RTA DRAW UNI/BI: CPT

## 2020-03-05 ASSESSMENT — TONOMETRY
OD_IOP_MMHG: 19
OS_IOP_MMHG: 16

## 2020-03-05 ASSESSMENT — VISUAL ACUITY
OS_CC: 20/40+2
OD_CC: 20/400

## 2020-03-11 ENCOUNTER — ESTABLISHED COMPREHENSIVE EXAM (OUTPATIENT)
Dept: URBAN - METROPOLITAN AREA CLINIC 43 | Facility: CLINIC | Age: 80
End: 2020-03-11

## 2020-03-11 DIAGNOSIS — H43.822: ICD-10-CM

## 2020-03-11 DIAGNOSIS — H52.222: ICD-10-CM

## 2020-03-11 DIAGNOSIS — H04.123: ICD-10-CM

## 2020-03-11 DIAGNOSIS — H40.1131: ICD-10-CM

## 2020-03-11 PROCEDURE — 92015 DETERMINE REFRACTIVE STATE: CPT

## 2020-03-11 PROCEDURE — 92014 COMPRE OPH EXAM EST PT 1/>: CPT

## 2020-03-11 ASSESSMENT — VISUAL ACUITY
OD_SC: CF 5FT
OS_SC: J12
OD_SC: <J12
OD_CC: CF 5FT
OD_CC: <J12
OS_SC: 20/60-2
OS_CC: J2
OS_CC: 20/30-2

## 2020-03-11 ASSESSMENT — TONOMETRY
OS_IOP_MMHG: 14
OD_IOP_MMHG: 15

## 2020-03-25 ENCOUNTER — CLINIC PROCEDURE ONLY (OUTPATIENT)
Dept: URBAN - METROPOLITAN AREA CLINIC 43 | Facility: CLINIC | Age: 80
End: 2020-03-25

## 2020-03-25 VITALS — HEIGHT: 60 IN

## 2020-03-25 DIAGNOSIS — H35.3211: ICD-10-CM

## 2020-03-25 DIAGNOSIS — H35.3221: ICD-10-CM

## 2020-03-25 PROCEDURE — 6702850 BILATERAL INTRAVITREAL INJECTION

## 2020-03-25 ASSESSMENT — VISUAL ACUITY
OD_CC: CF 4FT
OS_CC: 20/30

## 2020-03-25 ASSESSMENT — TONOMETRY
OD_IOP_MMHG: 13
OS_IOP_MMHG: 23 (X2)

## 2020-05-20 ENCOUNTER — CLINIC PROCEDURE ONLY (OUTPATIENT)
Dept: URBAN - METROPOLITAN AREA CLINIC 43 | Facility: CLINIC | Age: 80
End: 2020-05-20

## 2020-05-20 DIAGNOSIS — H35.3221: ICD-10-CM

## 2020-05-20 DIAGNOSIS — H35.3211: ICD-10-CM

## 2020-05-20 PROCEDURE — 6702850 BILATERAL INTRAVITREAL INJECTION

## 2020-05-20 ASSESSMENT — VISUAL ACUITY
OD_SC: CF 6FT
OS_SC: 20/30+2

## 2020-05-20 ASSESSMENT — TONOMETRY
OD_IOP_MMHG: 16
OS_IOP_MMHG: 13

## 2020-07-15 ENCOUNTER — CLINIC PROCEDURE ONLY (OUTPATIENT)
Dept: URBAN - METROPOLITAN AREA CLINIC 43 | Facility: CLINIC | Age: 80
End: 2020-07-15

## 2020-07-15 DIAGNOSIS — H35.3221: ICD-10-CM

## 2020-07-15 DIAGNOSIS — H35.3211: ICD-10-CM

## 2020-07-15 PROCEDURE — 6702850 BILATERAL INTRAVITREAL INJECTION

## 2020-07-15 ASSESSMENT — TONOMETRY
OD_IOP_MMHG: 15
OS_IOP_MMHG: 13

## 2020-07-15 ASSESSMENT — VISUAL ACUITY
OD_CC: CF 6FT
OS_CC: 20/30+2

## 2020-09-03 ENCOUNTER — ESTABLISHED PATIENT (OUTPATIENT)
Dept: URBAN - METROPOLITAN AREA CLINIC 43 | Facility: CLINIC | Age: 80
End: 2020-09-03

## 2020-09-03 DIAGNOSIS — H35.3221: ICD-10-CM

## 2020-09-03 DIAGNOSIS — H43.812: ICD-10-CM

## 2020-09-03 DIAGNOSIS — H43.392: ICD-10-CM

## 2020-09-03 DIAGNOSIS — H43.821: ICD-10-CM

## 2020-09-03 DIAGNOSIS — H35.3211: ICD-10-CM

## 2020-09-03 PROCEDURE — 92134 CPTRZ OPH DX IMG PST SGM RTA: CPT

## 2020-09-03 PROCEDURE — 92014 COMPRE OPH EXAM EST PT 1/>: CPT

## 2020-09-03 PROCEDURE — 92202 OPSCPY EXTND ON/MAC DRAW: CPT

## 2020-09-03 PROCEDURE — 6702850 BILATERAL INTRAVITREAL INJECTION

## 2020-09-03 ASSESSMENT — TONOMETRY
OD_IOP_MMHG: 14
OS_IOP_MMHG: 15

## 2020-09-03 ASSESSMENT — VISUAL ACUITY
OD_CC: 20/400
OS_CC: 20/25-1

## 2020-09-09 ENCOUNTER — IOP CHECK (OUTPATIENT)
Dept: URBAN - METROPOLITAN AREA CLINIC 43 | Facility: CLINIC | Age: 80
End: 2020-09-09

## 2020-09-09 DIAGNOSIS — H43.821: ICD-10-CM

## 2020-09-09 DIAGNOSIS — H04.123: ICD-10-CM

## 2020-09-09 DIAGNOSIS — H40.1131: ICD-10-CM

## 2020-09-09 PROCEDURE — 92083 EXTENDED VISUAL FIELD XM: CPT

## 2020-09-09 PROCEDURE — 92012 INTRM OPH EXAM EST PATIENT: CPT

## 2020-09-09 ASSESSMENT — TONOMETRY
OD_IOP_MMHG: 14
OS_IOP_MMHG: 14

## 2020-09-09 ASSESSMENT — VISUAL ACUITY
OD_CC: 20/400 EF
OS_CC: 20/30

## 2020-10-14 ENCOUNTER — CLINIC PROCEDURE ONLY (OUTPATIENT)
Dept: URBAN - METROPOLITAN AREA CLINIC 43 | Facility: CLINIC | Age: 80
End: 2020-10-14

## 2020-10-14 VITALS — HEIGHT: 60 IN

## 2020-10-14 DIAGNOSIS — H35.3211: ICD-10-CM

## 2020-10-14 DIAGNOSIS — H35.3221: ICD-10-CM

## 2020-10-14 PROCEDURE — 6702850 BILATERAL INTRAVITREAL INJECTION

## 2020-10-14 ASSESSMENT — VISUAL ACUITY
OD_CC: 20/400
OS_CC: 20/25+2

## 2020-10-14 ASSESSMENT — TONOMETRY
OD_IOP_MMHG: 14
OS_IOP_MMHG: 15

## 2020-11-25 ENCOUNTER — CLINIC PROCEDURE ONLY (OUTPATIENT)
Dept: URBAN - METROPOLITAN AREA CLINIC 43 | Facility: CLINIC | Age: 80
End: 2020-11-25

## 2020-11-25 VITALS — HEIGHT: 60 IN

## 2020-11-25 DIAGNOSIS — H35.3221: ICD-10-CM

## 2020-11-25 DIAGNOSIS — H35.3211: ICD-10-CM

## 2020-11-25 PROCEDURE — 6702850 BILATERAL INTRAVITREAL INJECTION

## 2020-11-25 ASSESSMENT — VISUAL ACUITY
OD_CC: 20/400
OS_CC: 20/20-2

## 2020-11-25 ASSESSMENT — TONOMETRY
OD_IOP_MMHG: 15
OS_IOP_MMHG: 16

## 2020-12-31 ENCOUNTER — ESTABLISHED PATIENT (OUTPATIENT)
Dept: URBAN - METROPOLITAN AREA CLINIC 43 | Facility: CLINIC | Age: 80
End: 2020-12-31

## 2020-12-31 VITALS — HEIGHT: 60 IN

## 2020-12-31 DIAGNOSIS — H43.392: ICD-10-CM

## 2020-12-31 DIAGNOSIS — H43.821: ICD-10-CM

## 2020-12-31 DIAGNOSIS — H35.3221: ICD-10-CM

## 2020-12-31 DIAGNOSIS — H35.3211: ICD-10-CM

## 2020-12-31 DIAGNOSIS — H43.812: ICD-10-CM

## 2020-12-31 PROCEDURE — 92014 COMPRE OPH EXAM EST PT 1/>: CPT

## 2020-12-31 PROCEDURE — 92202 OPSCPY EXTND ON/MAC DRAW: CPT

## 2020-12-31 PROCEDURE — 92134 CPTRZ OPH DX IMG PST SGM RTA: CPT

## 2020-12-31 ASSESSMENT — VISUAL ACUITY
OS_CC: 20/25-2
OD_CC: CF 6FT

## 2020-12-31 ASSESSMENT — TONOMETRY
OS_IOP_MMHG: 16
OD_IOP_MMHG: 17

## 2021-01-06 ENCOUNTER — CLINIC PROCEDURE ONLY (OUTPATIENT)
Dept: URBAN - METROPOLITAN AREA CLINIC 43 | Facility: CLINIC | Age: 81
End: 2021-01-06

## 2021-01-06 DIAGNOSIS — H35.3221: ICD-10-CM

## 2021-01-06 DIAGNOSIS — H35.3211: ICD-10-CM

## 2021-01-06 PROCEDURE — 6702850 BILATERAL INTRAVITREAL INJECTION

## 2021-01-06 ASSESSMENT — TONOMETRY
OD_IOP_MMHG: 14
OS_IOP_MMHG: 14

## 2021-01-06 ASSESSMENT — VISUAL ACUITY
OD_CC: 20/300
OS_CC: 20/30-2+1

## 2021-02-24 ENCOUNTER — CLINIC PROCEDURE ONLY (OUTPATIENT)
Dept: URBAN - METROPOLITAN AREA CLINIC 43 | Facility: CLINIC | Age: 81
End: 2021-02-24

## 2021-02-24 VITALS — HEIGHT: 60 IN

## 2021-02-24 DIAGNOSIS — H35.3221: ICD-10-CM

## 2021-02-24 DIAGNOSIS — H35.3211: ICD-10-CM

## 2021-02-24 PROCEDURE — 6702850 BILATERAL INTRAVITREAL INJECTION

## 2021-02-24 ASSESSMENT — VISUAL ACUITY
OD_CC: CF 6FT
OS_CC: 20/30+1

## 2021-02-24 ASSESSMENT — TONOMETRY
OD_IOP_MMHG: 14
OS_IOP_MMHG: 14

## 2021-03-10 ENCOUNTER — ESTABLISHED COMPREHENSIVE EXAM (OUTPATIENT)
Dept: URBAN - METROPOLITAN AREA CLINIC 43 | Facility: CLINIC | Age: 81
End: 2021-03-10

## 2021-03-10 DIAGNOSIS — H35.3211: ICD-10-CM

## 2021-03-10 DIAGNOSIS — H52.03: ICD-10-CM

## 2021-03-10 DIAGNOSIS — H43.821: ICD-10-CM

## 2021-03-10 DIAGNOSIS — H40.1131: ICD-10-CM

## 2021-03-10 DIAGNOSIS — H35.3221: ICD-10-CM

## 2021-03-10 PROCEDURE — 92133 CPTRZD OPH DX IMG PST SGM ON: CPT

## 2021-03-10 PROCEDURE — 92015 DETERMINE REFRACTIVE STATE: CPT

## 2021-03-10 PROCEDURE — 92014 COMPRE OPH EXAM EST PT 1/>: CPT

## 2021-03-10 ASSESSMENT — TONOMETRY
OS_IOP_MMHG: 15
OD_IOP_MMHG: 16

## 2021-03-10 ASSESSMENT — VISUAL ACUITY
OS_SC: 20/80-2
OD_SC: CF 6FT EF
OD_CC: <J12
OS_SC: J12
OD_CC: CF 6FT EF
OS_CC: 20/40-2
OS_CC: J1
OD_SC: <J12

## 2021-04-06 ENCOUNTER — ESTABLISHED PATIENT (OUTPATIENT)
Dept: URBAN - METROPOLITAN AREA CLINIC 39 | Facility: CLINIC | Age: 81
End: 2021-04-06

## 2021-04-06 VITALS — HEIGHT: 60 IN

## 2021-04-06 DIAGNOSIS — H43.821: ICD-10-CM

## 2021-04-06 DIAGNOSIS — H35.3221: ICD-10-CM

## 2021-04-06 DIAGNOSIS — H43.813: ICD-10-CM

## 2021-04-06 DIAGNOSIS — H43.392: ICD-10-CM

## 2021-04-06 DIAGNOSIS — H35.3211: ICD-10-CM

## 2021-04-06 PROCEDURE — 6702850 BILATERAL INTRAVITREAL INJECTION

## 2021-04-06 PROCEDURE — 92134 CPTRZ OPH DX IMG PST SGM RTA: CPT

## 2021-04-06 PROCEDURE — 92012 INTRM OPH EXAM EST PATIENT: CPT

## 2021-04-06 PROCEDURE — 92202 OPSCPY EXTND ON/MAC DRAW: CPT

## 2021-04-06 PROCEDURE — C9257 BEVACIZUMAB INJECTION: HCPCS

## 2021-04-06 ASSESSMENT — VISUAL ACUITY
OS_CC: 20/25+2
OD_CC: 20/400

## 2021-04-06 ASSESSMENT — TONOMETRY
OS_IOP_MMHG: 16
OD_IOP_MMHG: 17

## 2021-05-19 ENCOUNTER — CLINIC PROCEDURE ONLY (OUTPATIENT)
Dept: URBAN - METROPOLITAN AREA CLINIC 43 | Facility: CLINIC | Age: 81
End: 2021-05-19

## 2021-05-19 VITALS — HEIGHT: 60 IN

## 2021-05-19 DIAGNOSIS — H35.3221: ICD-10-CM

## 2021-05-19 DIAGNOSIS — H35.3211: ICD-10-CM

## 2021-05-19 PROCEDURE — 6702850 BILATERAL INTRAVITREAL INJECTION

## 2021-05-19 PROCEDURE — C9257 BEVACIZUMAB INJECTION: HCPCS

## 2021-05-19 ASSESSMENT — VISUAL ACUITY
OD_CC: 20/400
OS_CC: 20/30-2

## 2021-05-19 ASSESSMENT — TONOMETRY
OS_IOP_MMHG: 14
OD_IOP_MMHG: 14

## 2021-06-25 ENCOUNTER — ESTABLISHED PATIENT (OUTPATIENT)
Dept: URBAN - METROPOLITAN AREA CLINIC 43 | Facility: CLINIC | Age: 81
End: 2021-06-25

## 2021-06-25 DIAGNOSIS — H43.813: ICD-10-CM

## 2021-06-25 DIAGNOSIS — H35.3211: ICD-10-CM

## 2021-06-25 DIAGNOSIS — H43.392: ICD-10-CM

## 2021-06-25 DIAGNOSIS — H35.3221: ICD-10-CM

## 2021-06-25 PROCEDURE — 92014 COMPRE OPH EXAM EST PT 1/>: CPT

## 2021-06-25 PROCEDURE — 92202 OPSCPY EXTND ON/MAC DRAW: CPT

## 2021-06-25 PROCEDURE — 92134 CPTRZ OPH DX IMG PST SGM RTA: CPT

## 2021-06-25 ASSESSMENT — VISUAL ACUITY
OS_CC: 20/20-2
OD_CC: 20/400

## 2021-06-25 ASSESSMENT — TONOMETRY
OS_IOP_MMHG: 14
OD_IOP_MMHG: 14

## 2021-06-30 ENCOUNTER — CLINIC PROCEDURE ONLY (OUTPATIENT)
Dept: URBAN - METROPOLITAN AREA CLINIC 43 | Facility: CLINIC | Age: 81
End: 2021-06-30

## 2021-06-30 VITALS — HEIGHT: 60 IN

## 2021-06-30 DIAGNOSIS — H35.3221: ICD-10-CM

## 2021-06-30 DIAGNOSIS — H35.3211: ICD-10-CM

## 2021-06-30 PROCEDURE — C9257 BEVACIZUMAB INJECTION: HCPCS

## 2021-06-30 PROCEDURE — 6702850 BILATERAL INTRAVITREAL INJECTION

## 2021-06-30 ASSESSMENT — TONOMETRY
OD_IOP_MMHG: 13
OS_IOP_MMHG: 13

## 2021-06-30 ASSESSMENT — VISUAL ACUITY
OD_CC: CF 6FT
OS_CC: 20/30

## 2021-07-09 NOTE — PATIENT DISCUSSION
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 RX AREDS 2 VITAMIN DAILY (Preservision sample given). DISCUSSED UV PROTECTION AND BENEFITS OF LEAFY GREENS. PATIENT IS NONSMOKER. +FHX OF AMD (MOTHER). MAC OCT TODAY. RTC 6 MO FOR DFE AND MAC OCT, SOONER IF ANY VISION CHANGES.

## 2021-08-18 ENCOUNTER — CLINIC PROCEDURE ONLY (OUTPATIENT)
Dept: URBAN - METROPOLITAN AREA CLINIC 43 | Facility: CLINIC | Age: 81
End: 2021-08-18

## 2021-08-18 DIAGNOSIS — H35.3221: ICD-10-CM

## 2021-08-18 DIAGNOSIS — H35.3211: ICD-10-CM

## 2021-08-18 PROCEDURE — 6702850 BILATERAL INTRAVITREAL INJECTION

## 2021-08-18 PROCEDURE — C9257 BEVACIZUMAB INJECTION: HCPCS

## 2021-08-18 ASSESSMENT — VISUAL ACUITY
OS_SC: 20/30+2
OD_SC: 20/400

## 2021-08-18 ASSESSMENT — TONOMETRY
OS_IOP_MMHG: 13
OD_IOP_MMHG: 14

## 2021-10-06 ENCOUNTER — ESTABLISHED PATIENT (OUTPATIENT)
Dept: URBAN - METROPOLITAN AREA CLINIC 43 | Facility: CLINIC | Age: 81
End: 2021-10-06

## 2021-10-06 DIAGNOSIS — H35.3221: ICD-10-CM

## 2021-10-06 DIAGNOSIS — H43.392: ICD-10-CM

## 2021-10-06 DIAGNOSIS — H40.1131: ICD-10-CM

## 2021-10-06 DIAGNOSIS — H43.813: ICD-10-CM

## 2021-10-06 DIAGNOSIS — H35.3211: ICD-10-CM

## 2021-10-06 PROCEDURE — 6702850 BILATERAL INTRAVITREAL INJECTION

## 2021-10-06 PROCEDURE — 92134 CPTRZ OPH DX IMG PST SGM RTA: CPT

## 2021-10-06 PROCEDURE — 92202 OPSCPY EXTND ON/MAC DRAW: CPT

## 2021-10-06 PROCEDURE — C9257 BEVACIZUMAB INJECTION: HCPCS

## 2021-10-06 PROCEDURE — 92014 COMPRE OPH EXAM EST PT 1/>: CPT

## 2021-10-06 ASSESSMENT — TONOMETRY
OS_IOP_MMHG: 13
OD_IOP_MMHG: 17

## 2021-10-06 ASSESSMENT — VISUAL ACUITY
OD_SC: 20/400
OS_SC: 20/40+2

## 2021-10-20 ENCOUNTER — IOP CHECK (OUTPATIENT)
Dept: URBAN - METROPOLITAN AREA CLINIC 43 | Facility: CLINIC | Age: 81
End: 2021-10-20

## 2021-10-20 DIAGNOSIS — H43.392: ICD-10-CM

## 2021-10-20 DIAGNOSIS — H40.1131: ICD-10-CM

## 2021-10-20 DIAGNOSIS — H04.123: ICD-10-CM

## 2021-10-20 DIAGNOSIS — H35.3211: ICD-10-CM

## 2021-10-20 PROCEDURE — 92012 INTRM OPH EXAM EST PATIENT: CPT

## 2021-10-20 PROCEDURE — 92083 EXTENDED VISUAL FIELD XM: CPT

## 2021-10-20 PROCEDURE — 92250 FUNDUS PHOTOGRAPHY W/I&R: CPT

## 2021-10-20 ASSESSMENT — TONOMETRY
OD_IOP_MMHG: 8
OS_IOP_MMHG: 6

## 2021-10-20 ASSESSMENT — VISUAL ACUITY: OS_CC: 20/25-2

## 2021-11-24 ENCOUNTER — CLINIC PROCEDURE ONLY (OUTPATIENT)
Dept: URBAN - METROPOLITAN AREA CLINIC 43 | Facility: CLINIC | Age: 81
End: 2021-11-24

## 2021-11-24 DIAGNOSIS — H35.3221: ICD-10-CM

## 2021-11-24 DIAGNOSIS — H35.3211: ICD-10-CM

## 2021-11-24 PROCEDURE — 6702850 BILATERAL INTRAVITREAL INJECTION

## 2021-11-24 PROCEDURE — C9257 BEVACIZUMAB INJECTION: HCPCS

## 2021-11-24 ASSESSMENT — VISUAL ACUITY
OS_CC: 20/30-1
OD_CC: 20/400

## 2021-11-24 ASSESSMENT — TONOMETRY
OS_IOP_MMHG: 12
OD_IOP_MMHG: 11

## 2021-12-29 ENCOUNTER — ESTABLISHED PATIENT (OUTPATIENT)
Dept: URBAN - METROPOLITAN AREA CLINIC 43 | Facility: CLINIC | Age: 81
End: 2021-12-29

## 2021-12-29 DIAGNOSIS — H43.813: ICD-10-CM

## 2021-12-29 DIAGNOSIS — H35.3221: ICD-10-CM

## 2021-12-29 DIAGNOSIS — H35.3211: ICD-10-CM

## 2021-12-29 PROCEDURE — 92014 COMPRE OPH EXAM EST PT 1/>: CPT

## 2021-12-29 PROCEDURE — 92202 OPSCPY EXTND ON/MAC DRAW: CPT

## 2021-12-29 PROCEDURE — 6702850 BILATERAL INTRAVITREAL INJECTION

## 2021-12-29 PROCEDURE — C9257 BEVACIZUMAB INJECTION: HCPCS

## 2021-12-29 PROCEDURE — 92134 CPTRZ OPH DX IMG PST SGM RTA: CPT

## 2021-12-29 ASSESSMENT — TONOMETRY
OS_IOP_MMHG: 12
OD_IOP_MMHG: 15

## 2021-12-29 ASSESSMENT — VISUAL ACUITY
OD_CC: 20/400
OS_CC: 20/25

## 2022-02-02 ENCOUNTER — CLINIC PROCEDURE ONLY (OUTPATIENT)
Dept: URBAN - METROPOLITAN AREA CLINIC 43 | Facility: CLINIC | Age: 82
End: 2022-02-02

## 2022-02-02 DIAGNOSIS — H35.3221: ICD-10-CM

## 2022-02-02 DIAGNOSIS — H35.3211: ICD-10-CM

## 2022-02-02 PROCEDURE — 6702850 BILATERAL INTRAVITREAL INJECTION

## 2022-02-02 PROCEDURE — C9257 BEVACIZUMAB INJECTION: HCPCS

## 2022-02-02 ASSESSMENT — TONOMETRY
OS_IOP_MMHG: 12
OD_IOP_MMHG: 15

## 2022-02-02 ASSESSMENT — VISUAL ACUITY: OS_CC: 20/25+1

## 2022-03-11 ENCOUNTER — ESTABLISHED PATIENT (OUTPATIENT)
Dept: URBAN - METROPOLITAN AREA CLINIC 43 | Facility: CLINIC | Age: 82
End: 2022-03-11

## 2022-03-11 DIAGNOSIS — H43.813: ICD-10-CM

## 2022-03-11 DIAGNOSIS — H35.3221: ICD-10-CM

## 2022-03-11 DIAGNOSIS — H35.3211: ICD-10-CM

## 2022-03-11 DIAGNOSIS — H43.392: ICD-10-CM

## 2022-03-11 PROCEDURE — 92014 COMPRE OPH EXAM EST PT 1/>: CPT

## 2022-03-11 PROCEDURE — 6702850 BILATERAL INTRAVITREAL INJECTION

## 2022-03-11 PROCEDURE — C9257 BEVACIZUMAB INJECTION: HCPCS

## 2022-03-11 PROCEDURE — 92134 CPTRZ OPH DX IMG PST SGM RTA: CPT

## 2022-03-11 PROCEDURE — 92202 OPSCPY EXTND ON/MAC DRAW: CPT

## 2022-03-11 ASSESSMENT — TONOMETRY
OS_IOP_MMHG: 13
OD_IOP_MMHG: 15

## 2022-03-11 ASSESSMENT — VISUAL ACUITY
OS_CC: 20/40-1+2
OD_CC: 20/400 EF
OS_PH: 20/30-2

## 2022-04-20 ENCOUNTER — COMPREHENSIVE EXAM (OUTPATIENT)
Dept: URBAN - METROPOLITAN AREA CLINIC 43 | Facility: CLINIC | Age: 82
End: 2022-04-20

## 2022-04-20 DIAGNOSIS — H35.3231: ICD-10-CM

## 2022-04-20 DIAGNOSIS — H40.1131: ICD-10-CM

## 2022-04-20 DIAGNOSIS — Z96.1: ICD-10-CM

## 2022-04-20 DIAGNOSIS — H04.123: ICD-10-CM

## 2022-04-20 DIAGNOSIS — H43.813: ICD-10-CM

## 2022-04-20 PROCEDURE — 92015 DETERMINE REFRACTIVE STATE: CPT

## 2022-04-20 PROCEDURE — 92014 COMPRE OPH EXAM EST PT 1/>: CPT

## 2022-04-20 PROCEDURE — 92133 CPTRZD OPH DX IMG PST SGM ON: CPT

## 2022-04-20 ASSESSMENT — TONOMETRY
OD_IOP_MMHG: 13
OS_IOP_MMHG: 14

## 2022-04-20 ASSESSMENT — VISUAL ACUITY
OD_CC: J12
OS_CC: 20/30-1+2
OS_CC: J1
OS_SC: 20/60-2
OS_SC: J12
OD_CC: CF 6FT
OD_SC: <J12
OD_SC: CF 6FT

## 2022-04-22 ENCOUNTER — CLINIC PROCEDURE ONLY (OUTPATIENT)
Dept: URBAN - METROPOLITAN AREA CLINIC 43 | Facility: CLINIC | Age: 82
End: 2022-04-22

## 2022-04-22 DIAGNOSIS — H35.3231: ICD-10-CM

## 2022-04-22 DIAGNOSIS — H40.1131: ICD-10-CM

## 2022-04-22 PROCEDURE — 6702850 BILATERAL INTRAVITREAL INJECTION

## 2022-04-22 ASSESSMENT — TONOMETRY
OD_IOP_MMHG: 14
OS_IOP_MMHG: 14

## 2022-04-22 ASSESSMENT — VISUAL ACUITY
OS_CC: 20/30-1
OD_CC: CF 4FT

## 2022-06-03 ENCOUNTER — ESTABLISHED PATIENT (OUTPATIENT)
Dept: URBAN - METROPOLITAN AREA CLINIC 43 | Facility: CLINIC | Age: 82
End: 2022-06-03

## 2022-06-03 ASSESSMENT — VISUAL ACUITY: OS_CC: 20/25+1

## 2022-06-03 ASSESSMENT — TONOMETRY
OD_IOP_MMHG: 13
OS_IOP_MMHG: 14

## 2022-07-15 ENCOUNTER — ESTABLISHED PATIENT (OUTPATIENT)
Dept: URBAN - METROPOLITAN AREA CLINIC 43 | Facility: CLINIC | Age: 82
End: 2022-07-15

## 2022-07-15 DIAGNOSIS — H40.1131: ICD-10-CM

## 2022-07-15 DIAGNOSIS — H35.413: ICD-10-CM

## 2022-07-15 DIAGNOSIS — H35.3231: ICD-10-CM

## 2022-07-15 DIAGNOSIS — H35.733: ICD-10-CM

## 2022-07-15 DIAGNOSIS — H35.033: ICD-10-CM

## 2022-07-15 DIAGNOSIS — H35.363: ICD-10-CM

## 2022-07-15 DIAGNOSIS — H43.813: ICD-10-CM

## 2022-07-15 DIAGNOSIS — H35.09: ICD-10-CM

## 2022-07-15 PROCEDURE — 99214 OFFICE O/P EST MOD 30 MIN: CPT

## 2022-07-15 PROCEDURE — 92242 FLUORESCEIN&ICG ANGIOGRAPHY: CPT

## 2022-07-15 PROCEDURE — 92134 CPTRZ OPH DX IMG PST SGM RTA: CPT

## 2022-07-15 PROCEDURE — 6702850 BILATERAL INTRAVITREAL INJECTION

## 2022-07-15 ASSESSMENT — TONOMETRY
OS_IOP_MMHG: 17
OD_IOP_MMHG: 19

## 2022-07-15 ASSESSMENT — VISUAL ACUITY: OS_SC: 20/25+1

## 2022-08-31 ENCOUNTER — CLINIC PROCEDURE ONLY (OUTPATIENT)
Dept: URBAN - METROPOLITAN AREA CLINIC 43 | Facility: CLINIC | Age: 82
End: 2022-08-31

## 2022-08-31 DIAGNOSIS — H35.733: ICD-10-CM

## 2022-08-31 DIAGNOSIS — H35.3231: ICD-10-CM

## 2022-08-31 PROCEDURE — 6702850 BILATERAL INTRAVITREAL INJECTION

## 2022-08-31 PROCEDURE — 92250 FUNDUS PHOTOGRAPHY W/I&R: CPT

## 2022-08-31 ASSESSMENT — VISUAL ACUITY
OD_CC: CF 6FT
OS_CC: 20/25

## 2022-08-31 ASSESSMENT — TONOMETRY
OD_IOP_MMHG: 19
OS_IOP_MMHG: 16

## 2022-10-19 ENCOUNTER — ESTABLISHED PATIENT (OUTPATIENT)
Dept: URBAN - METROPOLITAN AREA CLINIC 43 | Facility: CLINIC | Age: 82
End: 2022-10-19

## 2022-10-19 DIAGNOSIS — H43.813: ICD-10-CM

## 2022-10-19 DIAGNOSIS — H35.3231: ICD-10-CM

## 2022-10-19 DIAGNOSIS — H35.413: ICD-10-CM

## 2022-10-19 DIAGNOSIS — H35.033: ICD-10-CM

## 2022-10-19 DIAGNOSIS — H35.363: ICD-10-CM

## 2022-10-19 DIAGNOSIS — H04.123: ICD-10-CM

## 2022-10-19 DIAGNOSIS — H40.1131: ICD-10-CM

## 2022-10-19 DIAGNOSIS — H35.733: ICD-10-CM

## 2022-10-19 PROCEDURE — 92242 FLUORESCEIN&ICG ANGIOGRAPHY: CPT

## 2022-10-19 PROCEDURE — 6702850 BILATERAL INTRAVITREAL INJECTION

## 2022-10-19 PROCEDURE — 99214 OFFICE O/P EST MOD 30 MIN: CPT

## 2022-10-19 PROCEDURE — 92134 CPTRZ OPH DX IMG PST SGM RTA: CPT

## 2022-10-19 ASSESSMENT — TONOMETRY
OS_IOP_MMHG: 12
OD_IOP_MMHG: 14

## 2022-10-19 ASSESSMENT — VISUAL ACUITY: OS_CC: 20/20-1

## 2022-10-21 ENCOUNTER — FOLLOW UP (OUTPATIENT)
Dept: URBAN - METROPOLITAN AREA CLINIC 43 | Facility: CLINIC | Age: 82
End: 2022-10-21

## 2022-10-21 DIAGNOSIS — H40.1131: ICD-10-CM

## 2022-10-21 DIAGNOSIS — H10.13: ICD-10-CM

## 2022-10-21 PROCEDURE — 92012 INTRM OPH EXAM EST PATIENT: CPT

## 2022-10-21 PROCEDURE — 92083 EXTENDED VISUAL FIELD XM: CPT

## 2022-10-21 RX ORDER — NEOMYCIN SULFATE, POLYMYXIN B SULFATE AND DEXAMETHASONE 3.5; 10000; 1 MG/G; [USP'U]/G; MG/G: OINTMENT OPHTHALMIC TWICE A DAY

## 2022-10-21 ASSESSMENT — VISUAL ACUITY
OS_CC: 20/25+1
OD_CC: CF 5FT

## 2022-10-21 ASSESSMENT — TONOMETRY
OS_IOP_MMHG: 14
OD_IOP_MMHG: 15

## 2022-12-21 ENCOUNTER — APPOINTMENT (RX ONLY)
Dept: URBAN - METROPOLITAN AREA CLINIC 137 | Facility: CLINIC | Age: 82
Setting detail: DERMATOLOGY
End: 2022-12-21

## 2022-12-21 DIAGNOSIS — Z71.89 OTHER SPECIFIED COUNSELING: ICD-10-CM

## 2022-12-21 DIAGNOSIS — L71.0 PERIORAL DERMATITIS: ICD-10-CM

## 2022-12-21 PROCEDURE — ? COUNSELING

## 2022-12-21 PROCEDURE — ? PRESCRIPTION MEDICATION MANAGEMENT

## 2022-12-21 PROCEDURE — 99203 OFFICE O/P NEW LOW 30 MIN: CPT

## 2022-12-21 PROCEDURE — ? PRESCRIPTION

## 2022-12-21 RX ORDER — TACROLIMUS 1 MG/G
OINTMENT TOPICAL
Qty: 60 | Refills: 1 | Status: ERX | COMMUNITY
Start: 2022-12-21

## 2022-12-21 RX ORDER — PREDNISONE 10 MG/1
TABLET ORAL
Qty: 14 | Refills: 0 | Status: ERX | COMMUNITY
Start: 2022-12-21

## 2022-12-21 RX ADMIN — PREDNISONE 1: 10 TABLET ORAL at 00:00

## 2022-12-21 RX ADMIN — TACROLIMUS: 1 OINTMENT TOPICAL at 00:00

## 2022-12-21 ASSESSMENT — LOCATION SIMPLE DESCRIPTION DERM: LOCATION SIMPLE: RIGHT CHEEK

## 2022-12-21 ASSESSMENT — LOCATION DETAILED DESCRIPTION DERM: LOCATION DETAILED: RIGHT SUPERIOR CENTRAL MALAR CHEEK

## 2022-12-21 ASSESSMENT — LOCATION ZONE DERM: LOCATION ZONE: FACE

## 2022-12-21 NOTE — PROCEDURE: PRESCRIPTION MEDICATION MANAGEMENT
Initiate Treatment: Tacrolimus 0.1% ointment apply to affected area thin layer to area under eyes twice daily x 1 week ([facility])\\nPrednisone 10mg take one tablet twice daily x 1 week ([facility])\\nCetaphil Gentle Cleanser to wash face\\nZyrtec 10mg take one tablet daily x 14 days
Detail Level: Zone
Render In Strict Bullet Format?: No

## 2023-01-18 ENCOUNTER — APPOINTMENT (RX ONLY)
Dept: URBAN - METROPOLITAN AREA CLINIC 137 | Facility: CLINIC | Age: 83
Setting detail: DERMATOLOGY
End: 2023-01-18

## 2023-01-18 DIAGNOSIS — L71.0 PERIORAL DERMATITIS: ICD-10-CM

## 2023-01-18 PROCEDURE — ? COUNSELING

## 2023-01-18 PROCEDURE — 99212 OFFICE O/P EST SF 10 MIN: CPT

## 2023-01-18 ASSESSMENT — LOCATION SIMPLE DESCRIPTION DERM
LOCATION SIMPLE: RIGHT CHEEK
LOCATION SIMPLE: LEFT CHEEK

## 2023-01-18 ASSESSMENT — LOCATION ZONE DERM: LOCATION ZONE: FACE

## 2023-01-18 ASSESSMENT — LOCATION DETAILED DESCRIPTION DERM
LOCATION DETAILED: RIGHT SUPERIOR CENTRAL MALAR CHEEK
LOCATION DETAILED: LEFT SUPERIOR CENTRAL MALAR CHEEK

## 2023-04-26 ENCOUNTER — COMPREHENSIVE EXAM (OUTPATIENT)
Dept: URBAN - METROPOLITAN AREA CLINIC 43 | Facility: CLINIC | Age: 83
End: 2023-04-26

## 2023-04-26 DIAGNOSIS — H43.813: ICD-10-CM

## 2023-04-26 DIAGNOSIS — H40.1131: ICD-10-CM

## 2023-04-26 DIAGNOSIS — H35.363: ICD-10-CM

## 2023-04-26 DIAGNOSIS — Z96.1: ICD-10-CM

## 2023-04-26 DIAGNOSIS — H35.3231: ICD-10-CM

## 2023-04-26 DIAGNOSIS — H04.123: ICD-10-CM

## 2023-04-26 DIAGNOSIS — H35.733: ICD-10-CM

## 2023-04-26 DIAGNOSIS — H43.392: ICD-10-CM

## 2023-04-26 PROCEDURE — 92015 DETERMINE REFRACTIVE STATE: CPT

## 2023-04-26 PROCEDURE — 92014 COMPRE OPH EXAM EST PT 1/>: CPT

## 2023-04-26 PROCEDURE — 92133 CPTRZD OPH DX IMG PST SGM ON: CPT

## 2023-04-26 ASSESSMENT — VISUAL ACUITY
OS_SC: 20/50-1
OD_SC: <J12
OD_CC: CF 5FT
OS_SC: J12
OS_CC: 20/25-2
OS_CC: J1
OD_SC: CF 5FT

## 2023-04-26 ASSESSMENT — TONOMETRY
OD_IOP_MMHG: 15
OS_IOP_MMHG: 17

## 2023-10-25 ENCOUNTER — FOLLOW UP (OUTPATIENT)
Dept: URBAN - METROPOLITAN AREA CLINIC 43 | Facility: CLINIC | Age: 83
End: 2023-10-25

## 2023-10-25 DIAGNOSIS — H35.363: ICD-10-CM

## 2023-10-25 DIAGNOSIS — H43.392: ICD-10-CM

## 2023-10-25 DIAGNOSIS — H04.123: ICD-10-CM

## 2023-10-25 DIAGNOSIS — H40.1131: ICD-10-CM

## 2023-10-25 DIAGNOSIS — H35.413: ICD-10-CM

## 2023-10-25 PROCEDURE — 92012 INTRM OPH EXAM EST PATIENT: CPT

## 2023-10-25 PROCEDURE — 92083 EXTENDED VISUAL FIELD XM: CPT

## 2023-10-25 RX ORDER — NEOMYCIN SULFATE, POLYMYXIN B SULFATE AND DEXAMETHASONE 3.5; 10000; 1 MG/G; [USP'U]/G; MG/G: OINTMENT OPHTHALMIC TWICE A DAY

## 2023-10-25 ASSESSMENT — VISUAL ACUITY
OD_CC: 5/200
OS_CC: 20/20-1

## 2024-02-22 ENCOUNTER — ESTABLISHED PATIENT (OUTPATIENT)
Dept: URBAN - METROPOLITAN AREA CLINIC 43 | Facility: CLINIC | Age: 84
End: 2024-02-22

## 2024-02-22 DIAGNOSIS — H35.3231: ICD-10-CM

## 2024-02-22 DIAGNOSIS — H40.1131: ICD-10-CM

## 2024-02-22 DIAGNOSIS — H43.392: ICD-10-CM

## 2024-02-22 DIAGNOSIS — H35.363: ICD-10-CM

## 2024-02-22 DIAGNOSIS — H35.413: ICD-10-CM

## 2024-02-22 DIAGNOSIS — H35.733: ICD-10-CM

## 2024-02-22 DIAGNOSIS — H04.123: ICD-10-CM

## 2024-02-22 DIAGNOSIS — H10.13: ICD-10-CM

## 2024-02-22 PROCEDURE — 92012 INTRM OPH EXAM EST PATIENT: CPT

## 2024-02-22 RX ORDER — NEOMYCIN SULFATE, POLYMYXIN B SULFATE AND DEXAMETHASONE 3.5; 10000; 1 MG/G; [USP'U]/G; MG/G: OINTMENT OPHTHALMIC TWICE A DAY

## 2024-02-22 ASSESSMENT — VISUAL ACUITY
OS_CC: 20/20-2
OD_CC: CF 5FT

## 2024-03-07 ENCOUNTER — FOLLOW UP (OUTPATIENT)
Dept: URBAN - METROPOLITAN AREA CLINIC 43 | Facility: CLINIC | Age: 84
End: 2024-03-07

## 2024-03-07 DIAGNOSIS — H35.733: ICD-10-CM

## 2024-03-07 DIAGNOSIS — H43.392: ICD-10-CM

## 2024-03-07 DIAGNOSIS — H04.123: ICD-10-CM

## 2024-03-07 DIAGNOSIS — H10.13: ICD-10-CM

## 2024-03-07 DIAGNOSIS — H35.413: ICD-10-CM

## 2024-03-07 DIAGNOSIS — H35.3231: ICD-10-CM

## 2024-03-07 DIAGNOSIS — H11.31: ICD-10-CM

## 2024-03-07 DIAGNOSIS — H35.363: ICD-10-CM

## 2024-03-07 DIAGNOSIS — H40.1131: ICD-10-CM

## 2024-03-07 PROCEDURE — 92012 INTRM OPH EXAM EST PATIENT: CPT

## 2024-03-07 ASSESSMENT — VISUAL ACUITY
OD_CC: CF 4FT EF
OS_CC: 20/25+2

## 2024-04-24 ENCOUNTER — COMPREHENSIVE EXAM (OUTPATIENT)
Dept: URBAN - METROPOLITAN AREA CLINIC 43 | Facility: CLINIC | Age: 84
End: 2024-04-24

## 2024-04-24 DIAGNOSIS — H43.392: ICD-10-CM

## 2024-04-24 DIAGNOSIS — H04.123: ICD-10-CM

## 2024-04-24 DIAGNOSIS — H35.733: ICD-10-CM

## 2024-04-24 DIAGNOSIS — H40.1131: ICD-10-CM

## 2024-04-24 DIAGNOSIS — H35.363: ICD-10-CM

## 2024-04-24 DIAGNOSIS — H35.413: ICD-10-CM

## 2024-04-24 DIAGNOSIS — H35.3231: ICD-10-CM

## 2024-04-24 DIAGNOSIS — H26.491: ICD-10-CM

## 2024-04-24 DIAGNOSIS — Z96.1: ICD-10-CM

## 2024-04-24 DIAGNOSIS — H02.133: ICD-10-CM

## 2024-04-24 DIAGNOSIS — H10.13: ICD-10-CM

## 2024-04-24 PROCEDURE — 92014 COMPRE OPH EXAM EST PT 1/>: CPT

## 2024-04-24 PROCEDURE — 92015 DETERMINE REFRACTIVE STATE: CPT

## 2024-04-24 PROCEDURE — 92133 CPTRZD OPH DX IMG PST SGM ON: CPT

## 2024-04-24 ASSESSMENT — VISUAL ACUITY
OD_SC: <J12
OD_CC: <J12
OS_SC: J12
OS_SC: 20/25
OD_SC: CF 6FT EF
OS_CC: J1
OS_CC: 20/25

## 2024-04-24 ASSESSMENT — TONOMETRY
OS_IOP_MMHG: 19
OD_IOP_MMHG: 19

## 2024-06-03 ENCOUNTER — PREPPED CHART (OUTPATIENT)
Dept: URBAN - METROPOLITAN AREA CLINIC 39 | Facility: CLINIC | Age: 84
End: 2024-06-03

## 2024-06-03 DIAGNOSIS — H26.493: ICD-10-CM

## 2024-06-03 PROCEDURE — 99214 OFFICE O/P EST MOD 30 MIN: CPT | Mod: 57

## 2024-07-03 ENCOUNTER — APPOINTMENT (RX ONLY)
Dept: URBAN - METROPOLITAN AREA CLINIC 137 | Facility: CLINIC | Age: 84
Setting detail: DERMATOLOGY
End: 2024-07-03

## 2024-07-03 DIAGNOSIS — L82.0 INFLAMED SEBORRHEIC KERATOSIS: ICD-10-CM | Status: INADEQUATELY CONTROLLED

## 2024-07-03 DIAGNOSIS — D49.2 NEOPLASM OF UNSPECIFIED BEHAVIOR OF BONE, SOFT TISSUE, AND SKIN: ICD-10-CM

## 2024-07-03 DIAGNOSIS — L81.4 OTHER MELANIN HYPERPIGMENTATION: ICD-10-CM | Status: UNCHANGED

## 2024-07-03 PROCEDURE — 17110 DESTRUCTION B9 LES UP TO 14: CPT

## 2024-07-03 PROCEDURE — 11102 TANGNTL BX SKIN SINGLE LES: CPT | Mod: 59

## 2024-07-03 PROCEDURE — 99212 OFFICE O/P EST SF 10 MIN: CPT | Mod: 25

## 2024-07-03 PROCEDURE — ? PHOTO-DOCUMENTATION

## 2024-07-03 PROCEDURE — ? COUNSELING

## 2024-07-03 PROCEDURE — 69100 BIOPSY OF EXTERNAL EAR: CPT

## 2024-07-03 PROCEDURE — ? BIOPSY BY SHAVE METHOD

## 2024-07-03 PROCEDURE — ? POINTED OUT BY PATIENT

## 2024-07-03 PROCEDURE — ? LIQUID NITROGEN

## 2024-07-03 ASSESSMENT — LOCATION DETAILED DESCRIPTION DERM
LOCATION DETAILED: RIGHT INFERIOR LATERAL LOWER BACK
LOCATION DETAILED: RIGHT POPLITEAL SKIN
LOCATION DETAILED: RIGHT DISTAL DORSAL FOREARM
LOCATION DETAILED: LEFT DISTAL DORSAL FOREARM
LOCATION DETAILED: SUPERIOR THORACIC SPINE
LOCATION DETAILED: STERNUM
LOCATION DETAILED: LEFT POSTERIOR EAR

## 2024-07-03 ASSESSMENT — LOCATION SIMPLE DESCRIPTION DERM
LOCATION SIMPLE: RIGHT FOREARM
LOCATION SIMPLE: LEFT EAR
LOCATION SIMPLE: UPPER BACK
LOCATION SIMPLE: LEFT FOREARM
LOCATION SIMPLE: RIGHT POPLITEAL SKIN
LOCATION SIMPLE: RIGHT LOWER BACK
LOCATION SIMPLE: CHEST

## 2024-07-03 ASSESSMENT — LOCATION ZONE DERM
LOCATION ZONE: EAR
LOCATION ZONE: LEG
LOCATION ZONE: TRUNK
LOCATION ZONE: ARM

## 2024-07-03 NOTE — HPI: SKIN LESIONS
Is This A New Presentation, Or A Follow-Up?: Skin Lesions
Additional History: Pt states lesion on back has changed in size and tends to get irritated with clothing.

## 2024-07-03 NOTE — PROCEDURE: BIOPSY BY SHAVE METHOD
Detail Level: Detailed
Depth Of Biopsy: dermis
Was A Bandage Applied: Yes
Size Of Lesion In Cm: 0.4
X Size Of Lesion In Cm: 0
Biopsy Type: H and E
Biopsy Method: Personna blade
Anesthesia Type: 1% lidocaine without epinephrine
Anesthesia Volume In Cc: 0.5
Hemostasis: Monsel's and Electrocautery
Wound Care: Petrolatum
Dressing: Band-Aid
Destruction After The Procedure: No
Type Of Destruction Used: Curettage
Curettage Text: The wound bed was treated with curettage after the biopsy was performed.
Cryotherapy Text: The wound bed was treated with cryotherapy after the biopsy was performed.
Electrodesiccation Text: The wound bed was treated with electrodesiccation after the biopsy was performed.
Electrodesiccation And Curettage Text: The wound bed was treated with electrodesiccation and curettage after the biopsy was performed.
Silver Nitrate Text: The wound bed was treated with silver nitrate after the biopsy was performed.
Lab: [id number]
Lab Facility: [id number]
Consent: Written consent was obtained and risks were reviewed including but not limited to scarring, infection, bleeding, scabbing, incomplete removal, nerve damage and allergy to anesthesia.
Post-Care Instructions: I reviewed with the patient in detail post-care instructions. Patient is to keep the biopsy site dry overnight, and then apply bacitracin twice daily until healed. Patient may apply hydrogen peroxide soaks to remove any crusting.
Notification Instructions: Patient will be notified of biopsy results. However, patient instructed to call the office if not contacted within 2 weeks.
Billing Type: Third-Party Bill
Information: Selecting Yes will display possible errors in your note based on the variables you have selected. This validation is only offered as a suggestion for you. PLEASE NOTE THAT THE VALIDATION TEXT WILL BE REMOVED WHEN YOU FINALIZE YOUR NOTE. IF YOU WANT TO FAX A PRELIMINARY NOTE YOU WILL NEED TO TOGGLE THIS TO 'NO' IF YOU DO NOT WANT IT IN YOUR FAXED NOTE.
Size Of Lesion In Cm: 0.3
Hemostasis: Electrocautery
Lab: [id number]

## 2024-10-24 ENCOUNTER — FOLLOW UP (OUTPATIENT)
Dept: URBAN - METROPOLITAN AREA CLINIC 43 | Facility: CLINIC | Age: 84
End: 2024-10-24

## 2024-10-24 DIAGNOSIS — H26.493: ICD-10-CM

## 2024-10-24 DIAGNOSIS — H40.1131: ICD-10-CM

## 2024-10-24 PROCEDURE — 92012 INTRM OPH EXAM EST PATIENT: CPT

## 2024-10-24 PROCEDURE — 92083 EXTENDED VISUAL FIELD XM: CPT

## 2025-01-27 ENCOUNTER — APPOINTMENT (OUTPATIENT)
Dept: URBAN - METROPOLITAN AREA CLINIC 137 | Facility: CLINIC | Age: 85
Setting detail: DERMATOLOGY
End: 2025-01-27

## 2025-01-27 DIAGNOSIS — B35.4 TINEA CORPORIS: ICD-10-CM

## 2025-01-27 DIAGNOSIS — B35.3 TINEA PEDIS: ICD-10-CM

## 2025-01-27 DIAGNOSIS — B35.1 TINEA UNGUIUM: ICD-10-CM

## 2025-01-27 PROCEDURE — ? PRESCRIPTION

## 2025-01-27 PROCEDURE — 99213 OFFICE O/P EST LOW 20 MIN: CPT

## 2025-01-27 PROCEDURE — ? COUNSELING

## 2025-01-27 RX ORDER — KETOCONAZOLE 20 MG/G
CREAM TOPICAL
Qty: 60 | Refills: 6 | Status: ERX | COMMUNITY
Start: 2025-01-27

## 2025-01-27 RX ORDER — FLUCONAZOLE 50 MG/1
TABLET ORAL
Qty: 5 | Refills: 0 | Status: ERX | COMMUNITY
Start: 2025-01-27

## 2025-01-27 RX ADMIN — KETOCONAZOLE: 20 CREAM TOPICAL at 00:00

## 2025-01-27 RX ADMIN — FLUCONAZOLE: 50 TABLET ORAL at 00:00

## 2025-01-27 ASSESSMENT — LOCATION SIMPLE DESCRIPTION DERM
LOCATION SIMPLE: RIGHT GREAT TOE
LOCATION SIMPLE: RIGHT 2ND TOE
LOCATION SIMPLE: RIGHT 4TH TOE
LOCATION SIMPLE: RIGHT 5TH TOE
LOCATION SIMPLE: RIGHT 3RD TOE
LOCATION SIMPLE: RIGHT FOOT
LOCATION SIMPLE: RIGHT LEG

## 2025-01-27 ASSESSMENT — LOCATION DETAILED DESCRIPTION DERM
LOCATION DETAILED: RIGHT LEG
LOCATION DETAILED: RIGHT 5TH TOENAIL
LOCATION DETAILED: RIGHT 3RD TOENAIL
LOCATION DETAILED: RIGHT 2ND TOENAIL
LOCATION DETAILED: RIGHT FOOT
LOCATION DETAILED: RIGHT GREAT TOENAIL
LOCATION DETAILED: RIGHT 4TH TOENAIL

## 2025-01-27 ASSESSMENT — LOCATION ZONE DERM
LOCATION ZONE: LEG
LOCATION ZONE: TOENAIL
LOCATION ZONE: ARM

## 2025-01-28 RX ORDER — FLUCONAZOLE 50 MG/1
TABLET ORAL
Qty: 5 | Refills: 0 | Status: ERX

## 2025-04-24 ENCOUNTER — COMPREHENSIVE EXAM (OUTPATIENT)
Age: 85
End: 2025-04-24

## 2025-04-24 DIAGNOSIS — Z96.1: ICD-10-CM

## 2025-04-24 DIAGNOSIS — H40.1131: ICD-10-CM

## 2025-04-24 DIAGNOSIS — H26.493: ICD-10-CM

## 2025-04-24 PROCEDURE — 92015 DETERMINE REFRACTIVE STATE: CPT

## 2025-04-24 PROCEDURE — 92014 COMPRE OPH EXAM EST PT 1/>: CPT

## 2025-04-24 PROCEDURE — 92133 CPTRZD OPH DX IMG PST SGM ON: CPT

## 2025-06-26 ENCOUNTER — CONSULTATION/EVALUATION (OUTPATIENT)
Age: 85
End: 2025-06-26

## 2025-06-26 DIAGNOSIS — H02.115: ICD-10-CM

## 2025-06-26 DIAGNOSIS — H02.112: ICD-10-CM

## 2025-06-26 PROCEDURE — 92285 EXTERNAL OCULAR PHOTOGRAPHY: CPT

## 2025-06-26 PROCEDURE — 99214 OFFICE O/P EST MOD 30 MIN: CPT
